# Patient Record
Sex: FEMALE | Race: WHITE | NOT HISPANIC OR LATINO | Employment: OTHER | URBAN - METROPOLITAN AREA
[De-identification: names, ages, dates, MRNs, and addresses within clinical notes are randomized per-mention and may not be internally consistent; named-entity substitution may affect disease eponyms.]

---

## 2017-05-09 ENCOUNTER — OFFICE VISIT (OUTPATIENT)
Dept: FAMILY MEDICINE CLINIC | Facility: CLINIC | Age: 57
End: 2017-05-09

## 2017-05-09 VITALS
HEIGHT: 62 IN | RESPIRATION RATE: 16 BRPM | HEART RATE: 68 BPM | WEIGHT: 171 LBS | BODY MASS INDEX: 31.47 KG/M2 | DIASTOLIC BLOOD PRESSURE: 84 MMHG | SYSTOLIC BLOOD PRESSURE: 126 MMHG | OXYGEN SATURATION: 98 %

## 2017-05-09 DIAGNOSIS — Z12.4 CERVICAL CANCER SCREENING: ICD-10-CM

## 2017-05-09 DIAGNOSIS — I10 ESSENTIAL HYPERTENSION: Primary | ICD-10-CM

## 2017-05-09 PROCEDURE — 99214 OFFICE O/P EST MOD 30 MIN: CPT | Performed by: FAMILY MEDICINE

## 2017-05-09 RX ORDER — LABETALOL 100 MG/1
100 TABLET, FILM COATED ORAL 3 TIMES DAILY
Qty: 270 TABLET | Refills: 3 | Status: SHIPPED | OUTPATIENT
Start: 2017-05-09 | End: 2021-01-28 | Stop reason: SDUPTHER

## 2018-05-14 RX ORDER — LABETALOL 100 MG/1
TABLET, FILM COATED ORAL
Qty: 90 TABLET | Refills: 11 | OUTPATIENT
Start: 2018-05-14

## 2020-07-23 ENCOUNTER — TRANSCRIBE ORDERS (OUTPATIENT)
Dept: ADMINISTRATIVE | Facility: HOSPITAL | Age: 60
End: 2020-07-23

## 2020-07-23 DIAGNOSIS — Z12.31 VISIT FOR SCREENING MAMMOGRAM: Primary | ICD-10-CM

## 2021-01-29 RX ORDER — LABETALOL 100 MG/1
100 TABLET, FILM COATED ORAL 3 TIMES DAILY
Qty: 270 TABLET | Refills: 0 | Status: SHIPPED | OUTPATIENT
Start: 2021-01-29 | End: 2021-04-16 | Stop reason: SDUPTHER

## 2021-04-16 ENCOUNTER — OFFICE VISIT (OUTPATIENT)
Dept: FAMILY MEDICINE CLINIC | Facility: CLINIC | Age: 61
End: 2021-04-16

## 2021-04-16 VITALS
BODY MASS INDEX: 33.23 KG/M2 | HEART RATE: 74 BPM | DIASTOLIC BLOOD PRESSURE: 70 MMHG | HEIGHT: 62 IN | WEIGHT: 180.6 LBS | TEMPERATURE: 98.7 F | SYSTOLIC BLOOD PRESSURE: 128 MMHG | OXYGEN SATURATION: 98 %

## 2021-04-16 DIAGNOSIS — Z85.42 HISTORY OF ENDOMETRIAL CANCER: ICD-10-CM

## 2021-04-16 DIAGNOSIS — I10 ESSENTIAL HYPERTENSION: Primary | ICD-10-CM

## 2021-04-16 DIAGNOSIS — E55.9 VITAMIN D DEFICIENCY: ICD-10-CM

## 2021-04-16 DIAGNOSIS — Z78.0 POSTMENOPAUSAL: ICD-10-CM

## 2021-04-16 PROCEDURE — 99204 OFFICE O/P NEW MOD 45 MIN: CPT | Performed by: FAMILY MEDICINE

## 2021-04-16 RX ORDER — ESTRADIOL 10 UG/1
1 TABLET VAGINAL 3 TIMES WEEKLY
COMMUNITY
Start: 2021-04-08 | End: 2022-06-07 | Stop reason: SDUPTHER

## 2021-04-16 RX ORDER — LABETALOL 100 MG/1
100 TABLET, FILM COATED ORAL 3 TIMES DAILY
Qty: 270 TABLET | Refills: 2 | Status: SHIPPED | OUTPATIENT
Start: 2021-04-16 | End: 2021-08-23

## 2021-04-16 NOTE — PROGRESS NOTES
"Chief Complaint  Establish Care    Subjective          Gini Solorzano presents to [facility] for   Establish care- Pt has last seen me in 2017; has been receiving care at [facility] Dr Morgan Alvarez  Last labs 1 year ago.    Has a history of endometrial cancer 1 year ago and hysterectomy 1 year ago. Follows with GYN at . Would like referred to GYN.    Has a history of Vit D deficiency and needs a check of Vit D.    Hypertension  This is a chronic problem. The current episode started more than 1 year ago. The problem is unchanged. The problem is controlled. Pertinent negatives include no anxiety, chest pain, malaise/fatigue, palpitations, peripheral edema or shortness of breath. There are no associated agents to hypertension. Risk factors for coronary artery disease include obesity, post-menopausal state and family history. Past treatments include beta blockers. Current antihypertension treatment includes beta blockers. The current treatment provides significant improvement. There are no compliance problems.            Objective   Vital Signs:   /70   Pulse 74   Temp 98.7 °F (37.1 °C)   Ht 157.5 cm (62.01\")   Wt 81.9 kg (180 lb 9.6 oz)   SpO2 98%   BMI 33.02 kg/m²       Review of Systems   Constitutional: Negative.  Negative for activity change, fatigue, fever, malaise/fatigue and unexpected weight change.   HENT: Negative.  Negative for congestion, sneezing and sore throat.    Eyes: Negative.  Negative for visual disturbance.   Respiratory: Negative.  Negative for cough, chest tightness, shortness of breath and wheezing.    Cardiovascular: Negative.  Negative for chest pain, palpitations and leg swelling.   Gastrointestinal: Negative.  Negative for abdominal distention, abdominal pain, blood in stool, constipation, diarrhea and nausea.   Endocrine: Negative.  Negative for cold intolerance and heat intolerance.   Genitourinary: Negative.  Negative for dysuria, frequency and " urgency.   Musculoskeletal: Negative.  Negative for arthralgias and myalgias.   Skin: Negative.  Negative for rash.   Allergic/Immunologic: Negative.    Neurological: Negative.  Negative for dizziness, syncope and numbness.   Hematological: Negative.  Negative for adenopathy.   Psychiatric/Behavioral: Negative.  Negative for suicidal ideas. The patient is not nervous/anxious.      Physical Exam  Vitals and nursing note reviewed.   Constitutional:       General: She is not in acute distress.     Appearance: She is well-developed. She is not diaphoretic.   HENT:      Right Ear: External ear normal.      Left Ear: External ear normal.      Nose: Nose normal.   Eyes:      Conjunctiva/sclera: Conjunctivae normal.      Pupils: Pupils are equal, round, and reactive to light.   Neck:      Thyroid: No thyromegaly.   Cardiovascular:      Rate and Rhythm: Normal rate and regular rhythm.      Heart sounds: Normal heart sounds. No murmur heard.     Pulmonary:      Effort: Pulmonary effort is normal. No respiratory distress.      Breath sounds: Normal breath sounds. No wheezing.   Abdominal:      General: Bowel sounds are normal. There is no distension.      Palpations: Abdomen is soft. There is no mass.      Tenderness: There is no abdominal tenderness. There is no guarding or rebound.      Hernia: No hernia is present.   Musculoskeletal:         General: No tenderness. Normal range of motion.      Cervical back: Normal range of motion and neck supple.   Lymphadenopathy:      Cervical: No cervical adenopathy.   Skin:     General: Skin is warm and dry.      Coloration: Skin is not pale.      Findings: No erythema or rash.   Neurological:      Mental Status: She is alert and oriented to person, place, and time.      Deep Tendon Reflexes: Reflexes are normal and symmetric.   Psychiatric:         Behavior: Behavior normal.         Thought Content: Thought content normal.         Judgment: Judgment normal.        Result Review :                  Assessment and Plan    Problem List Items Addressed This Visit     None      Visit Diagnoses     Essential hypertension    -  Primary    Relevant Medications    labetalol (NORMODYNE) 100 MG tablet    Other Relevant Orders    CBC & Differential    Comprehensive Metabolic Panel    Lipid Panel    TSH    Postmenopausal        Relevant Orders    Ambulatory Referral to Obstetrics / Gynecology    History of endometrial cancer        Relevant Orders    Ambulatory Referral to Obstetrics / Gynecology    Vitamin D deficiency        Relevant Orders    Vitamin D 25 Hydroxy        I spent 45 minutes caring for Gini on this date of service. This time includes time spent by me in the following activities:preparing for the visit, reviewing tests, obtaining and/or reviewing a separately obtained history, performing a medically appropriate examination and/or evaluation , counseling and educating the patient/family/caregiver, ordering medications, tests, or procedures, referring and communicating with other health care professionals , documenting information in the medical record, independently interpreting results and communicating that information with the patient/family/caregiver and care coordination  Follow Up   Return in about 3 months (around 7/16/2021).  Patient was given instructions and counseling regarding her condition or for health maintenance advice. Please see specific information pulled into the AVS if appropriate.

## 2021-07-07 ENCOUNTER — TELEPHONE (OUTPATIENT)
Dept: FAMILY MEDICINE CLINIC | Facility: CLINIC | Age: 61
End: 2021-07-07

## 2021-07-07 NOTE — TELEPHONE ENCOUNTER
Caller: Gini Solorzano    Relationship: Self    Best call back number:     [phone]     What is the best time to reach you:     ANY TIME    Who are you requesting to speak with (clinical staff, provider,  specific staff member):     DR LOMBARDI    Do you know the name of the person who called:     N/A    What was the call regarding:     PATIENT READ ABOUT A BETA BLOCKER THAT SHE HAS BEEN TAKING FOR YEARS AND HOW PEOPLE OVER 60 YEARS SHOULD NOT BE TAKING THE MEDICATION    THE MEDICATION NAME IS LABETALOL    PATIENT WOULD LIKE DR LOMBARDI TO READ UP ON MEDICATION AND DANGERS IN TAKING IT WHEN OVER 60 YEARS TO BE ABLE TO DISCUSS FULLY DURING THE APPOINTMENT    Do you require a callback:     PATIENT WOULD LIKE DR LOMBARDI TO GIVE HER A CALL BACK     PATIENT IS CONCERNED THAT SHE SHOULD BE PLACED ON A DIFFERENT MEDICATION    NIH ARTICLE ONLINE REGARDING THIS MEDICATION AND HOW PEOPLE OVER 60 YEARS SHOULD NOT BE TAKING    DR LOMBARDI, DO

## 2021-07-08 NOTE — TELEPHONE ENCOUNTER
Please have pt follow up in office. Beta blockers are cardioprotective. If she would like referred to Cardiology then let me know. I am not the one that started the Labatalol.

## 2021-07-09 ENCOUNTER — LAB (OUTPATIENT)
Dept: FAMILY MEDICINE CLINIC | Facility: CLINIC | Age: 61
End: 2021-07-09

## 2021-07-09 DIAGNOSIS — E55.9 VITAMIN D DEFICIENCY: ICD-10-CM

## 2021-07-09 DIAGNOSIS — I10 ESSENTIAL HYPERTENSION: ICD-10-CM

## 2021-07-09 LAB
25(OH)D3 SERPL-MCNC: 58 NG/ML
ALBUMIN SERPL-MCNC: 4.7 G/DL (ref 3.5–5.2)
ALBUMIN/GLOB SERPL: 1.7 G/DL
ALP SERPL-CCNC: 49 U/L (ref 39–117)
ALT SERPL W P-5'-P-CCNC: 28 U/L (ref 1–33)
ANION GAP SERPL CALCULATED.3IONS-SCNC: 8.1 MMOL/L (ref 5–15)
AST SERPL-CCNC: 27 U/L (ref 1–32)
BASOPHILS # BLD AUTO: 0.08 10*3/MM3 (ref 0–0.2)
BASOPHILS NFR BLD AUTO: 1.1 % (ref 0–1.5)
BILIRUB SERPL-MCNC: 0.6 MG/DL (ref 0–1.2)
BUN SERPL-MCNC: 12 MG/DL (ref 8–23)
BUN/CREAT SERPL: 14.3 (ref 7–25)
CALCIUM SPEC-SCNC: 9.5 MG/DL (ref 8.6–10.5)
CHLORIDE SERPL-SCNC: 103 MMOL/L (ref 98–107)
CHOLEST SERPL-MCNC: 183 MG/DL (ref 0–200)
CO2 SERPL-SCNC: 28.9 MMOL/L (ref 22–29)
CREAT SERPL-MCNC: 0.84 MG/DL (ref 0.57–1)
DEPRECATED RDW RBC AUTO: 41.8 FL (ref 37–54)
EOSINOPHIL # BLD AUTO: 0.31 10*3/MM3 (ref 0–0.4)
EOSINOPHIL NFR BLD AUTO: 4.4 % (ref 0.3–6.2)
ERYTHROCYTE [DISTWIDTH] IN BLOOD BY AUTOMATED COUNT: 12.8 % (ref 12.3–15.4)
GFR SERPL CREATININE-BSD FRML MDRD: 69 ML/MIN/1.73
GLOBULIN UR ELPH-MCNC: 2.8 GM/DL
GLUCOSE SERPL-MCNC: 106 MG/DL (ref 65–99)
HCT VFR BLD AUTO: 42.1 % (ref 34–46.6)
HDLC SERPL-MCNC: 47 MG/DL (ref 40–60)
HGB BLD-MCNC: 14.3 G/DL (ref 12–15.9)
IMM GRANULOCYTES # BLD AUTO: 0.02 10*3/MM3 (ref 0–0.05)
IMM GRANULOCYTES NFR BLD AUTO: 0.3 % (ref 0–0.5)
LDLC SERPL CALC-MCNC: 122 MG/DL (ref 0–100)
LDLC/HDLC SERPL: 2.56 {RATIO}
LYMPHOCYTES # BLD AUTO: 2.57 10*3/MM3 (ref 0.7–3.1)
LYMPHOCYTES NFR BLD AUTO: 36.2 % (ref 19.6–45.3)
MCH RBC QN AUTO: 30 PG (ref 26.6–33)
MCHC RBC AUTO-ENTMCNC: 34 G/DL (ref 31.5–35.7)
MCV RBC AUTO: 88.4 FL (ref 79–97)
MONOCYTES # BLD AUTO: 0.73 10*3/MM3 (ref 0.1–0.9)
MONOCYTES NFR BLD AUTO: 10.3 % (ref 5–12)
NEUTROPHILS NFR BLD AUTO: 3.39 10*3/MM3 (ref 1.7–7)
NEUTROPHILS NFR BLD AUTO: 47.7 % (ref 42.7–76)
NRBC BLD AUTO-RTO: 0 /100 WBC (ref 0–0.2)
PLATELET # BLD AUTO: 388 10*3/MM3 (ref 140–450)
PMV BLD AUTO: 9.8 FL (ref 6–12)
POTASSIUM SERPL-SCNC: 4.6 MMOL/L (ref 3.5–5.2)
PROT SERPL-MCNC: 7.5 G/DL (ref 6–8.5)
RBC # BLD AUTO: 4.76 10*6/MM3 (ref 3.77–5.28)
SODIUM SERPL-SCNC: 140 MMOL/L (ref 136–145)
TRIGL SERPL-MCNC: 78 MG/DL (ref 0–150)
TSH SERPL DL<=0.05 MIU/L-ACNC: 2.87 UIU/ML (ref 0.27–4.2)
VLDLC SERPL-MCNC: 14 MG/DL (ref 5–40)
WBC # BLD AUTO: 7.1 10*3/MM3 (ref 3.4–10.8)

## 2021-07-09 PROCEDURE — 82306 VITAMIN D 25 HYDROXY: CPT | Performed by: FAMILY MEDICINE

## 2021-07-09 PROCEDURE — 85025 COMPLETE CBC W/AUTO DIFF WBC: CPT | Performed by: FAMILY MEDICINE

## 2021-07-09 PROCEDURE — 80053 COMPREHEN METABOLIC PANEL: CPT | Performed by: FAMILY MEDICINE

## 2021-07-09 PROCEDURE — 80061 LIPID PANEL: CPT | Performed by: FAMILY MEDICINE

## 2021-07-09 PROCEDURE — 84443 ASSAY THYROID STIM HORMONE: CPT | Performed by: FAMILY MEDICINE

## 2021-07-20 ENCOUNTER — OFFICE VISIT (OUTPATIENT)
Dept: FAMILY MEDICINE CLINIC | Facility: CLINIC | Age: 61
End: 2021-07-20

## 2021-07-20 VITALS
HEART RATE: 70 BPM | DIASTOLIC BLOOD PRESSURE: 94 MMHG | HEIGHT: 62 IN | SYSTOLIC BLOOD PRESSURE: 146 MMHG | WEIGHT: 176 LBS | TEMPERATURE: 98.2 F | BODY MASS INDEX: 32.39 KG/M2 | OXYGEN SATURATION: 98 %

## 2021-07-20 DIAGNOSIS — I10 ESSENTIAL HYPERTENSION: Primary | Chronic | ICD-10-CM

## 2021-07-20 DIAGNOSIS — F41.9 ANXIETY: Chronic | ICD-10-CM

## 2021-07-20 PROCEDURE — 99214 OFFICE O/P EST MOD 30 MIN: CPT | Performed by: FAMILY MEDICINE

## 2021-07-20 RX ORDER — HYDROCHLOROTHIAZIDE 12.5 MG/1
12.5 TABLET ORAL DAILY
Qty: 30 TABLET | Refills: 1 | Status: SHIPPED | OUTPATIENT
Start: 2021-07-20 | End: 2021-08-23 | Stop reason: SDUPTHER

## 2021-07-20 RX ORDER — CITALOPRAM 20 MG/1
TABLET ORAL
Qty: 30 TABLET | Refills: 0 | Status: SHIPPED | OUTPATIENT
Start: 2021-07-20 | End: 2021-08-23 | Stop reason: SDUPTHER

## 2021-07-20 NOTE — PROGRESS NOTES
"Chief Complaint  Hypertension (follow up and discuss BP meds)    Subjective          Gini Solorzano presents to [facility] for   Pt concerned about Labetalol and has read an article about beta blockers. Pt has lower HR on med and increased fatigue.  Has increased anxiety and thinks this stress needs lowered with a possible anti-anxiety med.    Hypertension  This is a chronic problem. The current episode started more than 1 year ago. The problem is unchanged. The problem is controlled. Pertinent negatives include no anxiety, chest pain, malaise/fatigue, palpitations, peripheral edema or shortness of breath. There are no associated agents to hypertension. Risk factors for coronary artery disease include obesity, post-menopausal state and family history. Past treatments include beta blockers. Current antihypertension treatment includes beta blockers. The current treatment provides significant improvement. There are no compliance problems.    Anxiety  Presents for initial visit. Onset was 1 to 6 months ago. The problem has been unchanged. Symptoms include excessive worry, insomnia, irritability, malaise and nervous/anxious behavior. Patient reports no chest pain, palpitations, panic, restlessness, shortness of breath or suicidal ideas. Symptoms occur occasionally. The severity of symptoms is mild. The symptoms are aggravated by work stress. The patient sleeps 7 hours per night. The quality of sleep is fair. Nighttime awakenings: occasional.           Objective   Vital Signs:   /94 (BP Location: Right arm, Patient Position: Sitting, Cuff Size: Adult)   Pulse 70   Temp 98.2 °F (36.8 °C) (Temporal)   Ht 157.5 cm (62\")   Wt 79.8 kg (176 lb)   SpO2 98%   BMI 32.19 kg/m²       Review of Systems   Constitutional: Positive for fatigue and irritability. Negative for fever, malaise/fatigue and unexpected weight change.   Respiratory: Negative for chest tightness, shortness of breath and " wheezing.    Cardiovascular: Negative for chest pain, palpitations and leg swelling.   Psychiatric/Behavioral: Negative for self-injury, sleep disturbance and suicidal ideas. The patient is nervous/anxious and has insomnia.      Physical Exam  Vitals and nursing note reviewed.   Constitutional:       General: She is not in acute distress.     Appearance: She is well-developed. She is not diaphoretic.   HENT:      Right Ear: External ear normal.      Left Ear: External ear normal.      Nose: Nose normal.   Eyes:      Conjunctiva/sclera: Conjunctivae normal.      Pupils: Pupils are equal, round, and reactive to light.   Neck:      Thyroid: No thyromegaly.   Cardiovascular:      Rate and Rhythm: Normal rate and regular rhythm.      Heart sounds: Normal heart sounds. No murmur heard.     Pulmonary:      Effort: Pulmonary effort is normal. No respiratory distress.      Breath sounds: Normal breath sounds. No wheezing.   Abdominal:      General: Bowel sounds are normal. There is no distension.      Palpations: Abdomen is soft. There is no mass.      Tenderness: There is no abdominal tenderness. There is no guarding or rebound.      Hernia: No hernia is present.   Musculoskeletal:         General: No tenderness. Normal range of motion.      Cervical back: Normal range of motion and neck supple.   Lymphadenopathy:      Cervical: No cervical adenopathy.   Skin:     General: Skin is warm and dry.      Coloration: Skin is not pale.      Findings: No erythema or rash.   Neurological:      Mental Status: She is alert and oriented to person, place, and time.      Deep Tendon Reflexes: Reflexes are normal and symmetric.   Psychiatric:         Behavior: Behavior normal.         Thought Content: Thought content normal.         Judgment: Judgment normal.        Result Review :     CBC & Differential (07/09/2021 09:33)  Comprehensive Metabolic Panel (07/09/2021 09:33)  Lipid Panel (07/09/2021 09:33)  TSH (07/09/2021 09:33)  Vitamin D  25 Hydroxy (07/09/2021 09:33)                Assessment and Plan    Problem List Items Addressed This Visit     None      Visit Diagnoses     Essential hypertension  (Chronic)   -  Primary    Relevant Medications    hydroCHLOROthiazide (HYDRODIURIL) 12.5 MG tablet    Anxiety  (Chronic)       Relevant Medications    citalopram (CeleXA) 20 MG tablet      Will slowly tape off Labetalol to 50 bid until seen in 1 month.    Follow Up   Return in about 4 weeks (around 8/17/2021).  Patient was given instructions and counseling regarding her condition or for health maintenance advice. Please see specific information pulled into the AVS if appropriate.

## 2021-08-23 ENCOUNTER — OFFICE VISIT (OUTPATIENT)
Dept: FAMILY MEDICINE CLINIC | Facility: CLINIC | Age: 61
End: 2021-08-23

## 2021-08-23 VITALS
OXYGEN SATURATION: 99 % | HEART RATE: 65 BPM | WEIGHT: 173.4 LBS | RESPIRATION RATE: 18 BRPM | BODY MASS INDEX: 31.91 KG/M2 | SYSTOLIC BLOOD PRESSURE: 130 MMHG | HEIGHT: 62 IN | DIASTOLIC BLOOD PRESSURE: 86 MMHG | TEMPERATURE: 97.5 F

## 2021-08-23 DIAGNOSIS — I10 ESSENTIAL HYPERTENSION: Chronic | ICD-10-CM

## 2021-08-23 DIAGNOSIS — F41.9 ANXIETY: Chronic | ICD-10-CM

## 2021-08-23 PROCEDURE — 99214 OFFICE O/P EST MOD 30 MIN: CPT | Performed by: FAMILY MEDICINE

## 2021-08-23 RX ORDER — HYDROCHLOROTHIAZIDE 12.5 MG/1
12.5 TABLET ORAL DAILY
Qty: 90 TABLET | Refills: 1 | Status: SHIPPED | OUTPATIENT
Start: 2021-08-23 | End: 2021-09-14

## 2021-08-23 RX ORDER — CITALOPRAM 20 MG/1
20 TABLET ORAL DAILY
Qty: 90 TABLET | Refills: 0 | Status: SHIPPED | OUTPATIENT
Start: 2021-08-23 | End: 2021-11-29

## 2021-08-23 RX ORDER — LOSARTAN POTASSIUM 50 MG/1
50 TABLET ORAL DAILY
Qty: 90 TABLET | Refills: 0 | Status: SHIPPED | OUTPATIENT
Start: 2021-08-23 | End: 2021-11-08

## 2021-08-23 NOTE — PROGRESS NOTES
"Chief Complaint  Hypertension (follow up)    Subjective          Gini Solorzano presents to [facility] for   Follow up BP. Trying to wean off of Labetalol. Has added HCTZ. BP is staying higher in the evenings.    Hypertension  This is a chronic problem. The current episode started more than 1 year ago. The problem is unchanged. The problem is controlled. Pertinent negatives include no anxiety, chest pain, malaise/fatigue, palpitations, peripheral edema or shortness of breath. There are no associated agents to hypertension. Risk factors for coronary artery disease include obesity, post-menopausal state and family history. Past treatments include beta blockers and diuretics. Current antihypertension treatment includes beta blockers and diuretics. The current treatment provides significant improvement. There are no compliance problems.    Anxiety  Presents for follow-up (Stable on Celexa) visit. Onset was 1 to 6 months ago. The problem has been unchanged. Symptoms include insomnia. Patient reports no chest pain, dizziness, excessive worry, irritability, malaise, nausea, nervous/anxious behavior, palpitations, panic, restlessness, shortness of breath or suicidal ideas. Symptoms occur occasionally. The severity of symptoms is mild. The symptoms are aggravated by work stress. The patient sleeps 7 hours per night. The quality of sleep is good. Nighttime awakenings: occasional.           Objective   Vital Signs:   /86 (BP Location: Right arm, Patient Position: Sitting, Cuff Size: Adult)   Pulse 65   Temp 97.5 °F (36.4 °C) (Infrared)   Resp 18   Ht 157.5 cm (62\")   Wt 78.7 kg (173 lb 6.4 oz)   SpO2 99%   BMI 31.72 kg/m²       Review of Systems   Constitutional: Negative.  Negative for activity change, fatigue, fever, irritability, malaise/fatigue and unexpected weight change.   HENT: Negative.  Negative for congestion, sneezing and sore throat.    Eyes: Negative.  Negative for " visual disturbance.   Respiratory: Negative.  Negative for cough, chest tightness, shortness of breath and wheezing.    Cardiovascular: Negative.  Negative for chest pain, palpitations and leg swelling.   Gastrointestinal: Negative.  Negative for abdominal distention, abdominal pain, blood in stool, constipation, diarrhea and nausea.   Endocrine: Negative.  Negative for cold intolerance and heat intolerance.   Genitourinary: Negative.  Negative for dysuria, frequency and urgency.   Musculoskeletal: Negative.  Negative for arthralgias and myalgias.   Skin: Negative.  Negative for rash.   Allergic/Immunologic: Negative.    Neurological: Negative.  Negative for dizziness, syncope and numbness.   Hematological: Negative.  Negative for adenopathy.   Psychiatric/Behavioral: Negative for suicidal ideas. The patient has insomnia. The patient is not nervous/anxious.      Physical Exam  Vitals and nursing note reviewed.   Constitutional:       General: She is not in acute distress.     Appearance: She is well-developed. She is not diaphoretic.   HENT:      Right Ear: External ear normal.      Left Ear: External ear normal.      Nose: Nose normal.   Eyes:      Conjunctiva/sclera: Conjunctivae normal.      Pupils: Pupils are equal, round, and reactive to light.   Neck:      Thyroid: No thyromegaly.   Cardiovascular:      Rate and Rhythm: Normal rate and regular rhythm.      Heart sounds: Normal heart sounds. No murmur heard.     Pulmonary:      Effort: Pulmonary effort is normal. No respiratory distress.      Breath sounds: Normal breath sounds. No wheezing.   Abdominal:      General: Bowel sounds are normal. There is no distension.      Palpations: Abdomen is soft. There is no mass.      Tenderness: There is no abdominal tenderness. There is no guarding or rebound.      Hernia: No hernia is present.   Musculoskeletal:         General: No tenderness. Normal range of motion.      Cervical back: Normal range of motion and neck  supple.   Lymphadenopathy:      Cervical: No cervical adenopathy.   Skin:     General: Skin is warm and dry.      Coloration: Skin is not pale.      Findings: No erythema or rash.   Neurological:      Mental Status: She is alert and oriented to person, place, and time.      Deep Tendon Reflexes: Reflexes are normal and symmetric.   Psychiatric:         Behavior: Behavior normal.         Thought Content: Thought content normal.         Judgment: Judgment normal.        Result Review :                 Assessment and Plan    Problem List Items Addressed This Visit     None      Visit Diagnoses     Anxiety  (Chronic)       Relevant Medications    citalopram (CeleXA) 20 MG tablet    Essential hypertension  (Chronic)       Relevant Medications    hydroCHLOROthiazide (HYDRODIURIL) 12.5 MG tablet    losartan (Cozaar) 50 MG tablet      OK to stop Labetalol.  Add Losartan 50 mg qd      Follow Up   Return in about 3 months (around 11/23/2021).  Patient was given instructions and counseling regarding her condition or for health maintenance advice. Please see specific information pulled into the AVS if appropriate.

## 2021-09-14 DIAGNOSIS — I10 ESSENTIAL HYPERTENSION: Chronic | ICD-10-CM

## 2021-09-14 RX ORDER — HYDROCHLOROTHIAZIDE 12.5 MG/1
TABLET ORAL
Qty: 30 TABLET | Refills: 2 | Status: SHIPPED | OUTPATIENT
Start: 2021-09-14 | End: 2021-11-08

## 2021-11-06 ENCOUNTER — TELEPHONE (OUTPATIENT)
Dept: FAMILY MEDICINE CLINIC | Facility: CLINIC | Age: 61
End: 2021-11-06

## 2021-11-06 NOTE — TELEPHONE ENCOUNTER
CALLED PT TO RESCHEDULE PATIENT DUE TO PROVIDER BEING OUT SHE WILL NOT HAVE ENOUGH MEDICATION TO GET THROUGH UNTIL 11/29/21 REQUESTING REFILL'S ON   LOSARTAN AND HYDROCHLOROTHIAZIDE

## 2021-11-08 DIAGNOSIS — I10 ESSENTIAL HYPERTENSION: Chronic | ICD-10-CM

## 2021-11-08 RX ORDER — LOSARTAN POTASSIUM 50 MG/1
50 TABLET ORAL DAILY
Qty: 30 TABLET | Refills: 0 | Status: SHIPPED | OUTPATIENT
Start: 2021-11-08 | End: 2021-11-29 | Stop reason: SDUPTHER

## 2021-11-08 RX ORDER — HYDROCHLOROTHIAZIDE 12.5 MG/1
12.5 TABLET ORAL DAILY
Qty: 30 TABLET | Refills: 0 | Status: SHIPPED | OUTPATIENT
Start: 2021-11-08 | End: 2021-11-29 | Stop reason: SDUPTHER

## 2021-11-29 ENCOUNTER — OFFICE VISIT (OUTPATIENT)
Dept: FAMILY MEDICINE CLINIC | Facility: CLINIC | Age: 61
End: 2021-11-29

## 2021-11-29 VITALS
HEIGHT: 62 IN | WEIGHT: 171.8 LBS | RESPIRATION RATE: 12 BRPM | DIASTOLIC BLOOD PRESSURE: 86 MMHG | TEMPERATURE: 98.4 F | BODY MASS INDEX: 31.62 KG/M2 | OXYGEN SATURATION: 97 % | SYSTOLIC BLOOD PRESSURE: 132 MMHG | HEART RATE: 79 BPM

## 2021-11-29 DIAGNOSIS — F41.9 ANXIETY: Chronic | ICD-10-CM

## 2021-11-29 DIAGNOSIS — I10 ESSENTIAL HYPERTENSION: Primary | ICD-10-CM

## 2021-11-29 PROBLEM — C54.1 ENDOMETRIAL ADENOCARCINOMA: Status: ACTIVE | Noted: 2020-01-13

## 2021-11-29 PROCEDURE — 90471 IMMUNIZATION ADMIN: CPT | Performed by: FAMILY MEDICINE

## 2021-11-29 PROCEDURE — 99213 OFFICE O/P EST LOW 20 MIN: CPT | Performed by: FAMILY MEDICINE

## 2021-11-29 PROCEDURE — 90686 IIV4 VACC NO PRSV 0.5 ML IM: CPT | Performed by: FAMILY MEDICINE

## 2021-11-29 RX ORDER — KETOCONAZOLE 20 MG/ML
SHAMPOO TOPICAL
COMMUNITY
Start: 2021-09-24

## 2021-11-29 RX ORDER — LOSARTAN POTASSIUM 50 MG/1
50 TABLET ORAL DAILY
Qty: 90 TABLET | Refills: 1 | Status: SHIPPED | OUTPATIENT
Start: 2021-11-29 | End: 2022-06-07 | Stop reason: SDUPTHER

## 2021-11-29 RX ORDER — CITALOPRAM 10 MG/1
10 TABLET ORAL DAILY
Qty: 90 TABLET | Refills: 1 | Status: SHIPPED | OUTPATIENT
Start: 2021-11-29 | End: 2022-06-07 | Stop reason: SDUPTHER

## 2021-11-29 RX ORDER — HYDROCHLOROTHIAZIDE 12.5 MG/1
12.5 TABLET ORAL DAILY
Qty: 90 TABLET | Refills: 1 | Status: SHIPPED | OUTPATIENT
Start: 2021-11-29 | End: 2022-06-07 | Stop reason: SDUPTHER

## 2021-11-29 NOTE — PROGRESS NOTES
"Chief Complaint  Hypertension/anxiety f/u    Subjective          Gini Solorzano presents to [facility] for   BP has been running higher in the evenings.    Taking Celexa 10 mg and stable.  Hypertension  This is a chronic problem. The current episode started more than 1 year ago. The problem is unchanged. The problem is controlled. Pertinent negatives include no anxiety, chest pain, malaise/fatigue, palpitations, peripheral edema or shortness of breath. There are no associated agents to hypertension. Risk factors for coronary artery disease include obesity, post-menopausal state and family history. Past treatments include beta blockers and diuretics. Current antihypertension treatment includes beta blockers and diuretics. The current treatment provides significant improvement. There are no compliance problems.    Anxiety  Presents for follow-up (Stable on Celexa) visit. Onset was 1 to 6 months ago. The problem has been unchanged. Patient reports no chest pain, dizziness, excessive worry, insomnia, irritability, malaise, nausea, nervous/anxious behavior, palpitations, panic, restlessness, shortness of breath or suicidal ideas. Symptoms occur occasionally. The severity of symptoms is mild. The symptoms are aggravated by work stress. The patient sleeps 7 hours per night. The quality of sleep is good. Nighttime awakenings: occasional.           Objective   Vital Signs:   /86   Pulse 79   Temp 98.4 °F (36.9 °C)   Resp 12   Ht 157.5 cm (62\")   Wt 77.9 kg (171 lb 12.8 oz)   SpO2 97%   BMI 31.42 kg/m²      Review of Systems   Constitutional: Negative.  Negative for activity change, fatigue, fever, irritability, malaise/fatigue and unexpected weight change.   HENT: Negative.  Negative for congestion, sneezing and sore throat.    Eyes: Negative.  Negative for visual disturbance.   Respiratory: Negative.  Negative for cough, chest tightness, shortness of breath and wheezing.  "   Cardiovascular: Negative.  Negative for chest pain, palpitations and leg swelling.   Gastrointestinal: Negative.  Negative for abdominal distention, abdominal pain, blood in stool, constipation, diarrhea and nausea.   Endocrine: Negative.  Negative for cold intolerance and heat intolerance.   Genitourinary: Negative.  Negative for dysuria, frequency and urgency.   Musculoskeletal: Negative.  Negative for arthralgias and myalgias.   Skin: Negative.  Negative for rash.   Allergic/Immunologic: Negative.    Neurological: Negative.  Negative for dizziness, syncope and numbness.   Hematological: Negative.  Negative for adenopathy.   Psychiatric/Behavioral: Negative.  Negative for suicidal ideas. The patient is not nervous/anxious and does not have insomnia.       Physical Exam  Vitals and nursing note reviewed.   Constitutional:       General: She is not in acute distress.     Appearance: She is well-developed. She is not diaphoretic.   HENT:      Right Ear: External ear normal.      Left Ear: External ear normal.      Nose: Nose normal.   Eyes:      Conjunctiva/sclera: Conjunctivae normal.   Neck:      Thyroid: No thyromegaly.   Cardiovascular:      Heart sounds: No murmur heard.      Pulmonary:      Effort: Pulmonary effort is normal. No respiratory distress.      Breath sounds: No wheezing.   Abdominal:      General: Bowel sounds are normal. There is no distension.      Palpations: Abdomen is soft. There is no mass.      Tenderness: There is no abdominal tenderness. There is no guarding or rebound.      Hernia: No hernia is present.   Musculoskeletal:         General: No tenderness. Normal range of motion.      Cervical back: Normal range of motion and neck supple.   Lymphadenopathy:      Cervical: No cervical adenopathy.   Skin:     General: Skin is warm and dry.      Coloration: Skin is not pale.      Findings: No erythema or rash.   Neurological:      Mental Status: She is alert and oriented to person, place, and  time.      Deep Tendon Reflexes: Reflexes are normal and symmetric.   Psychiatric:         Behavior: Behavior normal.         Thought Content: Thought content normal.         Judgment: Judgment normal.        Result Review :                 Assessment and Plan    Problem List Items Addressed This Visit     None      Visit Diagnoses     Essential hypertension    -  Primary    Relevant Medications    hydroCHLOROthiazide (HYDRODIURIL) 12.5 MG tablet    losartan (Cozaar) 50 MG tablet    Anxiety  (Chronic)       Relevant Medications    citalopram (CeleXA) 10 MG tablet          Follow Up   Return in about 3 months (around 2/28/2022).  Patient was given instructions and counseling regarding her condition or for health maintenance advice. Please see specific information pulled into the AVS if appropriate.

## 2022-06-07 DIAGNOSIS — I10 ESSENTIAL HYPERTENSION: ICD-10-CM

## 2022-06-07 DIAGNOSIS — F41.9 ANXIETY: Chronic | ICD-10-CM

## 2022-06-07 RX ORDER — CITALOPRAM 10 MG/1
10 TABLET ORAL DAILY
Qty: 90 TABLET | Refills: 1 | Status: SHIPPED | OUTPATIENT
Start: 2022-06-07 | End: 2022-12-01

## 2022-06-07 RX ORDER — CITALOPRAM 10 MG/1
10 TABLET ORAL DAILY
Qty: 90 TABLET | Refills: 1 | Status: CANCELLED | OUTPATIENT
Start: 2022-06-07

## 2022-06-07 RX ORDER — HYDROCHLOROTHIAZIDE 12.5 MG/1
12.5 TABLET ORAL DAILY
Qty: 90 TABLET | Refills: 1 | Status: SHIPPED | OUTPATIENT
Start: 2022-06-07 | End: 2022-12-01

## 2022-06-07 RX ORDER — ESTRADIOL 10 UG/1
1 TABLET VAGINAL 3 TIMES WEEKLY
Qty: 8 TABLET | Refills: 2 | Status: SHIPPED | OUTPATIENT
Start: 2022-06-08 | End: 2022-06-16

## 2022-06-07 RX ORDER — LOSARTAN POTASSIUM 50 MG/1
50 TABLET ORAL DAILY
Qty: 90 TABLET | Refills: 1 | Status: SHIPPED | OUTPATIENT
Start: 2022-06-07 | End: 2022-12-01

## 2022-06-07 NOTE — TELEPHONE ENCOUNTER
Caller: Gini Solorzano    Relationship: Self    Best call back number: [phone]    Requested Prescriptions:   Requested Prescriptions     Pending Prescriptions Disp Refills   • citalopram (CeleXA) 10 MG tablet 90 tablet 1     Sig: Take 1 tablet by mouth Daily.   • hydroCHLOROthiazide (HYDRODIURIL) 12.5 MG tablet 90 tablet 1     Sig: Take 1 tablet by mouth Daily.   • losartan (Cozaar) 50 MG tablet 90 tablet 1     Sig: Take 1 tablet by mouth Daily.   • Yuvafem 10 MCG tablet vaginal tablet 8 tablet      Sig: Insert 1 tablet into the vagina 3 (Three) Times a Week.        Pharmacy where request should be sent: [address] [phone] [facility] [phone]      Additional details provided by patient: PATIENT IS OFFBOARDED FROM TARSHA LOMBARDI AND HAS HER NEW PATIENT APPOINTMENT ON 6/16 WITH [facility]. SHE WILL RUN OUT OF HER MEDICATION THIS WEEK. PLEASE ASK [facility] TO SEND IN ENOUGH UNTIL THE APPOINTMENT.    PLEASE CALL WHEN THIS IS CALLED IN    Does the patient have less than a 3 day supply:  [x] Yes  [] No    Lakshmi Lam, PCT   06/07/22 10:38 EDT

## 2022-06-16 ENCOUNTER — OFFICE VISIT (OUTPATIENT)
Dept: FAMILY MEDICINE CLINIC | Facility: CLINIC | Age: 62
End: 2022-06-16

## 2022-06-16 VITALS
BODY MASS INDEX: 31.65 KG/M2 | DIASTOLIC BLOOD PRESSURE: 80 MMHG | WEIGHT: 172 LBS | RESPIRATION RATE: 18 BRPM | OXYGEN SATURATION: 97 % | SYSTOLIC BLOOD PRESSURE: 128 MMHG | HEIGHT: 62 IN | HEART RATE: 66 BPM | TEMPERATURE: 98.6 F

## 2022-06-16 DIAGNOSIS — F41.9 ANXIETY: ICD-10-CM

## 2022-06-16 DIAGNOSIS — I10 ESSENTIAL HYPERTENSION: Primary | ICD-10-CM

## 2022-06-16 PROCEDURE — 99214 OFFICE O/P EST MOD 30 MIN: CPT | Performed by: STUDENT IN AN ORGANIZED HEALTH CARE EDUCATION/TRAINING PROGRAM

## 2022-06-16 NOTE — PROGRESS NOTES
New Patient Office Visit      Patient Name: Gini Solorzano  : 1960   MRN: [id number]     Chief Complaint:  anxiety    History of Present Illness:     anxiety  Taking citalopram. She is not suicidal or homicidal.     htn  Taking cozar and hctz. She checks her bp frequently at home. We discussed normal parameters. She says it is typically 130 systolic.       She uses a vaginal estrogen tablet. She had a hysterectomy due to cancer. I have advised her to no longer take this. It was last called in not by me , but under my name per protocol via my MA. I have called pharmacy to cancel refills on this and advised patient to not take given risk of cancer. Reviewed note Dr. barkley wanting her to come for surveillance at dr ramirez office who she has seen in the past. I have advised her to call their office asap for surveillance apt. She says she has their number.         Subjective        Past Medical History:   Diagnosis Date   • Hypertension        Past Surgical History:   Procedure Laterality Date   • MOUTH SURGERY         Family History   Problem Relation Age of Onset   • Cancer Mother         lung   • Hypertension Father    • Hypertension Brother        Social History     Socioeconomic History   • Marital status:    Tobacco Use   • Smoking status: Never Smoker   • Smokeless tobacco: Never Used   Vaping Use   • Vaping Use: Never used   Substance and Sexual Activity   • Alcohol use: No   • Drug use: No   • Sexual activity: Defer          Current Outpatient Medications:   •  citalopram (CeleXA) 10 MG tablet, Take 1 tablet by mouth Daily., Disp: 90 tablet, Rfl: 1  •  hydroCHLOROthiazide (HYDRODIURIL) 12.5 MG tablet, Take 1 tablet by mouth Daily., Disp: 90 tablet, Rfl: 1  •  ketoconazole (NIZORAL) 2 % shampoo, , Disp: , Rfl:   •  losartan (Cozaar) 50 MG tablet, Take 1 tablet by mouth Daily., Disp: 90 tablet, Rfl: 1    Allergies   Allergen Reactions   • Other      Unknown medication given when patient  "had wisdom teeth out       Objective     Physical Exam:  Vitals:    06/16/22 0838   BP: 128/80   Pulse: 66   Resp: 18   Temp: 98.6 °F (37 °C)   SpO2: 97%   Weight: 78 kg (172 lb)   Height: 157.5 cm (62\")      Body mass index is 31.46 kg/m².     Physical Exam  Constitutional:       General: She is not in acute distress.     Appearance: Normal appearance.   HENT:      Head: Normocephalic and atraumatic.   Eyes:      Extraocular Movements: Extraocular movements intact.   Cardiovascular:      Rate and Rhythm: Normal rate and regular rhythm.      Heart sounds: No murmur heard.  Pulmonary:      Effort: Pulmonary effort is normal. No respiratory distress.      Breath sounds: Normal breath sounds.   Abdominal:      General: Abdomen is flat.   Musculoskeletal:         General: No swelling.      Cervical back: Normal range of motion.   Skin:     Findings: No rash.   Neurological:      General: No focal deficit present.      Mental Status: She is alert.   Psychiatric:         Mood and Affect: Mood normal.              Assessment / Plan      Assessment/Plan:   Diagnoses and all orders for this visit:    1. Essential hypertension (Primary)    2. Anxiety     Continue current medications for anxiety and htn.     She says she was told she has dry eye by her optometrist. Advised referral to ophthalmology, she declines.     For vaginal dryness see above hpi.    Return in about 5 months (around 11/16/2022) for Annual.       Sandra Miranda D.O.  [facility] [place]     "

## 2022-11-10 ENCOUNTER — FLU SHOT (OUTPATIENT)
Dept: FAMILY MEDICINE CLINIC | Facility: CLINIC | Age: 62
End: 2022-11-10

## 2022-11-10 DIAGNOSIS — Z23 IMMUNIZATION DUE: Primary | ICD-10-CM

## 2022-11-10 PROCEDURE — 90471 IMMUNIZATION ADMIN: CPT | Performed by: STUDENT IN AN ORGANIZED HEALTH CARE EDUCATION/TRAINING PROGRAM

## 2022-11-10 PROCEDURE — 90686 IIV4 VACC NO PRSV 0.5 ML IM: CPT | Performed by: STUDENT IN AN ORGANIZED HEALTH CARE EDUCATION/TRAINING PROGRAM

## 2022-11-17 ENCOUNTER — LAB (OUTPATIENT)
Dept: LAB | Facility: HOSPITAL | Age: 62
End: 2022-11-17

## 2022-11-17 ENCOUNTER — OFFICE VISIT (OUTPATIENT)
Dept: FAMILY MEDICINE CLINIC | Facility: CLINIC | Age: 62
End: 2022-11-17

## 2022-11-17 VITALS
TEMPERATURE: 97.8 F | WEIGHT: 174.6 LBS | SYSTOLIC BLOOD PRESSURE: 122 MMHG | OXYGEN SATURATION: 99 % | RESPIRATION RATE: 17 BRPM | HEART RATE: 75 BPM | DIASTOLIC BLOOD PRESSURE: 78 MMHG | BODY MASS INDEX: 32.13 KG/M2 | HEIGHT: 62 IN

## 2022-11-17 DIAGNOSIS — Z86.39 HISTORY OF VITAMIN D DEFICIENCY: ICD-10-CM

## 2022-11-17 DIAGNOSIS — E78.00 ELEVATED LDL CHOLESTEROL LEVEL: ICD-10-CM

## 2022-11-17 DIAGNOSIS — Z12.31 ENCOUNTER FOR SCREENING MAMMOGRAM FOR MALIGNANT NEOPLASM OF BREAST: ICD-10-CM

## 2022-11-17 DIAGNOSIS — Z00.00 ANNUAL PHYSICAL EXAM: Primary | ICD-10-CM

## 2022-11-17 DIAGNOSIS — I10 ESSENTIAL HYPERTENSION: ICD-10-CM

## 2022-11-17 LAB
25(OH)D3 SERPL-MCNC: 55.1 NG/ML (ref 30–100)
ALBUMIN SERPL-MCNC: 4.9 G/DL (ref 3.5–5.2)
ALBUMIN/GLOB SERPL: 1.5 G/DL
ALP SERPL-CCNC: 56 U/L (ref 39–117)
ALT SERPL W P-5'-P-CCNC: 18 U/L (ref 1–33)
ANION GAP SERPL CALCULATED.3IONS-SCNC: 10.7 MMOL/L (ref 5–15)
AST SERPL-CCNC: 31 U/L (ref 1–32)
BILIRUB SERPL-MCNC: 0.3 MG/DL (ref 0–1.2)
BUN SERPL-MCNC: 11 MG/DL (ref 8–23)
BUN/CREAT SERPL: 13.6 (ref 7–25)
CALCIUM SPEC-SCNC: 9.9 MG/DL (ref 8.6–10.5)
CHLORIDE SERPL-SCNC: 101 MMOL/L (ref 98–107)
CHOLEST SERPL-MCNC: 220 MG/DL (ref 0–200)
CO2 SERPL-SCNC: 28.3 MMOL/L (ref 22–29)
CREAT SERPL-MCNC: 0.81 MG/DL (ref 0.57–1)
DEPRECATED RDW RBC AUTO: 38.1 FL (ref 37–54)
EGFRCR SERPLBLD CKD-EPI 2021: 81.7 ML/MIN/1.73
ERYTHROCYTE [DISTWIDTH] IN BLOOD BY AUTOMATED COUNT: 11.9 % (ref 12.3–15.4)
GLOBULIN UR ELPH-MCNC: 3.2 GM/DL
GLUCOSE SERPL-MCNC: 79 MG/DL (ref 65–99)
HBA1C MFR BLD: 6.4 % (ref 4.8–5.6)
HCT VFR BLD AUTO: 42.3 % (ref 34–46.6)
HDLC SERPL-MCNC: 51 MG/DL (ref 40–60)
HGB BLD-MCNC: 14.8 G/DL (ref 12–15.9)
LDLC SERPL CALC-MCNC: 145 MG/DL (ref 0–100)
LDLC/HDLC SERPL: 2.8 {RATIO}
MCH RBC QN AUTO: 30.2 PG (ref 26.6–33)
MCHC RBC AUTO-ENTMCNC: 35 G/DL (ref 31.5–35.7)
MCV RBC AUTO: 86.3 FL (ref 79–97)
PLATELET # BLD AUTO: 429 10*3/MM3 (ref 140–450)
PMV BLD AUTO: 9.6 FL (ref 6–12)
POTASSIUM SERPL-SCNC: 4 MMOL/L (ref 3.5–5.2)
PROT SERPL-MCNC: 8.1 G/DL (ref 6–8.5)
RBC # BLD AUTO: 4.9 10*6/MM3 (ref 3.77–5.28)
SODIUM SERPL-SCNC: 140 MMOL/L (ref 136–145)
TRIGL SERPL-MCNC: 132 MG/DL (ref 0–150)
TSH SERPL DL<=0.05 MIU/L-ACNC: 3.82 UIU/ML (ref 0.27–4.2)
VLDLC SERPL-MCNC: 24 MG/DL (ref 5–40)
WBC NRBC COR # BLD: 8.38 10*3/MM3 (ref 3.4–10.8)

## 2022-11-17 PROCEDURE — 99214 OFFICE O/P EST MOD 30 MIN: CPT | Performed by: STUDENT IN AN ORGANIZED HEALTH CARE EDUCATION/TRAINING PROGRAM

## 2022-11-17 PROCEDURE — 80053 COMPREHEN METABOLIC PANEL: CPT

## 2022-11-17 PROCEDURE — 80061 LIPID PANEL: CPT

## 2022-11-17 PROCEDURE — 82306 VITAMIN D 25 HYDROXY: CPT

## 2022-11-17 PROCEDURE — 85027 COMPLETE CBC AUTOMATED: CPT

## 2022-11-17 PROCEDURE — 83036 HEMOGLOBIN GLYCOSYLATED A1C: CPT

## 2022-11-17 PROCEDURE — 99396 PREV VISIT EST AGE 40-64: CPT | Performed by: STUDENT IN AN ORGANIZED HEALTH CARE EDUCATION/TRAINING PROGRAM

## 2022-11-17 PROCEDURE — 84443 ASSAY THYROID STIM HORMONE: CPT

## 2022-11-17 NOTE — ASSESSMENT & PLAN NOTE
Annual exam  Colonoscopy done 2020  Mammogram up to date .   Pap smear: she says she went to gynecology dr. tellez and had pelvic exam this year. Will request records.   Counseled on diet and exercise including limited sweets and sugars to focus on lean meat and vegetables. Counseled on 50 minutes of moderate exercise 3 times per week.   Recmmend dental and eye exam  hiv hep c screening: she declines  covid vaccine boosted  Flu vaccine up to date  Shingles: recommended   tdap recommended.

## 2022-11-17 NOTE — PROGRESS NOTES
"Chief Complaint  Annual Exam (Pt fell in October, ribs are still not healed(left breast area) , left arm pain from booster months ago )    History of Present Illness       Fall  She says she was hiking and stepped in a hole with her left foot and fell forward. She says her phone or hand hit her ribs on the left side and she still has pain from this . Happened on October. The pain is getting better she says. She says it is more so painful with picking something up. No sob hemoptysis fever.     She also says that she has some pain in her left deltoid since her covid booster. No lymphadenopathy.      Annual exam  Colonoscopy done 2020  Mammogram up to date .   Pap smear: she says she went to gynecology dr. tellez and had pelvic exam this year. Will request records.   Counseled on diet and exercise including limited sweets and sugars to focus on lean meat and vegetables. Counseled on 50 minutes of moderate exercise 3 times per week.   Recmmend dental and eye exam  hiv hep c screening: she declines  covid vaccine boosted  Flu vaccine up to date  Shingles: recommended   tdap recommended.           The following portions of the patient's history were reviewed and updated as appropriate: allergies, current medications, past family history, past medical history, past social history, past surgical history, and problem list.    OBJECTIVE:  /78   Pulse 75   Temp 97.8 °F (36.6 °C)   Resp 17   Ht 157.5 cm (62\")   Wt 79.2 kg (174 lb 9.6 oz)   SpO2 99%   BMI 31.93 kg/m²       Physical Exam  Constitutional:       General: She is not in acute distress.     Appearance: Normal appearance.   HENT:      Head: Normocephalic and atraumatic.   Eyes:      Extraocular Movements: Extraocular movements intact.   Cardiovascular:      Rate and Rhythm: Normal rate and regular rhythm.      Heart sounds: No murmur heard.  Pulmonary:      Effort: Pulmonary effort is normal. No respiratory distress.      Breath sounds: Normal breath " sounds. No stridor. No wheezing, rhonchi or rales.   Musculoskeletal:      Comments: Rib pain on palpation on rib 10 on the left.   No bruising  Normal rom  No lymphadenopathy of left axilla present.   Skin:     Findings: No rash.      Comments: Left deltoid without pain on palpation.   No redness swelling .  No lumps found  Muscle feels tight   Neurological:      General: No focal deficit present.      Mental Status: She is alert.   Psychiatric:         Mood and Affect: Mood normal.                    Assessment and Plan   Diagnoses and all orders for this visit:    1. Annual physical exam (Primary)  Assessment & Plan:  Annual exam  Colonoscopy done 2020  Mammogram up to date .   Pap smear: she says she went to gynecology dr. tellez and had pelvic exam this year. Will request records.   Counseled on diet and exercise including limited sweets and sugars to focus on lean meat and vegetables. Counseled on 50 minutes of moderate exercise 3 times per week.   Recmmend dental and eye exam  hiv hep c screening: she declines  covid vaccine boosted  Flu vaccine up to date  Shingles: recommended   tdap recommended.       2. Essential hypertension  -     CBC (No Diff); Future  -     Comprehensive Metabolic Panel; Future  -     TSH Rfx On Abnormal To Free T4; Future  -     Cancel: Vitamin B12; Future  -     Cancel: Vitamin D,25-Hydroxy; Future    3. Elevated LDL cholesterol level  -     Hemoglobin A1c; Future  -     Lipid Panel; Future    4. Encounter for screening mammogram for malignant neoplasm of breast  -     Cancel: Mammo screening digital tomosynthesis bilateral w CAD; Future    5. History of vitamin D deficiency  -     Vitamin D,25-Hydroxy; Future      Rib pain  Deltoid pain  Xray ribs given fall with rib pain. She says this is getting better.   Recommend ultrasound of deltoid. She would like to hold off at this time.       Return in about 6 months (around 5/17/2023).       Sandra Miranda D.O.  [facility]  [place]

## 2022-11-18 ENCOUNTER — TELEPHONE (OUTPATIENT)
Dept: FAMILY MEDICINE CLINIC | Facility: CLINIC | Age: 62
End: 2022-11-18

## 2022-11-18 NOTE — TELEPHONE ENCOUNTER
HUB OKAY TO READ!!    Sandra Miranda, DO l  Please let her know that her a1c shows prediabetic almost in the diabetic range and cholesterol is elevated which is a risk factor for heart attack stroke or vascular complications over time. Recommend diet with lean meat fish and vegetables and low sugar/carbs. Can refer to phone dietitian if this helps let me know. We can also start a medication called lipitor which can lower this risk. It can cause muscle pain or elevated liver tests for which we would monitor. Let me know if interested.

## 2022-11-18 NOTE — TELEPHONE ENCOUNTER
JesusAngela         12:50 PM  Note   ANKUSH OKAY TO READ!!     Kyra Mirandaly, DO l  Please let her know that her a1c shows prediabetic almost in the diabetic range and cholesterol is elevated which is a risk factor for heart attack stroke or vascular complications over time. Recommend diet with lean meat fish and vegetables and low sugar/carbs. Can refer to phone dietitian if this helps let me know. We can also start a medication called lipitor which can lower this risk. It can cause muscle pain or elevated liver tests for which we would monitor. Let me know if interested.            PATIENT INFORMED OF HUB TO READ. PATIENT WANTS TO KNOW IF SHE CAN SHE TAKE METFORMIN? NOT INTERESTED IN LIPITOR

## 2022-12-01 DIAGNOSIS — F41.9 ANXIETY: Chronic | ICD-10-CM

## 2022-12-01 DIAGNOSIS — I10 ESSENTIAL HYPERTENSION: ICD-10-CM

## 2022-12-01 RX ORDER — LOSARTAN POTASSIUM 50 MG/1
TABLET ORAL
Qty: 90 TABLET | Refills: 0 | Status: SHIPPED | OUTPATIENT
Start: 2022-12-01 | End: 2023-03-03 | Stop reason: SDUPTHER

## 2022-12-01 RX ORDER — CITALOPRAM 10 MG/1
TABLET ORAL
Qty: 90 TABLET | Refills: 0 | Status: SHIPPED | OUTPATIENT
Start: 2022-12-01 | End: 2023-03-09 | Stop reason: SDUPTHER

## 2022-12-01 RX ORDER — HYDROCHLOROTHIAZIDE 12.5 MG/1
TABLET ORAL
Qty: 90 TABLET | Refills: 0 | Status: SHIPPED | OUTPATIENT
Start: 2022-12-01 | End: 2023-03-09 | Stop reason: SDUPTHER

## 2023-02-01 ENCOUNTER — TELEPHONE (OUTPATIENT)
Dept: FAMILY MEDICINE CLINIC | Facility: CLINIC | Age: 63
End: 2023-02-01

## 2023-02-01 NOTE — TELEPHONE ENCOUNTER
It is okay for her to take mucinex. She also could consider taking paxlovid antiviral. If she is interested please have her set up a video visit with one of our providers. I am not in the rest of the week please send any response to on call.

## 2023-02-01 NOTE — TELEPHONE ENCOUNTER
"Hub please read: \"It is okay for her to take mucinex. She also could consider taking paxlovid antiviral. If she is interested please have her set up a video visit with one of our providers. I am not in the rest of the week please send any response to on call. \"  "

## 2023-02-01 NOTE — TELEPHONE ENCOUNTER
Caller: Gini Solorzano    Relationship to patient: Self    Best call back number: [phone] LEAVE VOICEMAIL IF NO ANSWER        Date of positive COVID19 test: 01/29/2023 WITH A HOME TEST         COVID19 symptoms: COUGH, STUFFY NOSE, RAW THROAT DUE TO COUGHING FATIGUE         Additional information or concerns: THE PATIENT WOULD LIKE TO KNOW IF SHE CAN TAKE MUCINEX DM OR IF SHE SHOULD TAKE ANOTHER MEDICATION PLEASE CALL THE PATIENT TO DISCUSS

## 2023-03-03 DIAGNOSIS — I10 ESSENTIAL HYPERTENSION: ICD-10-CM

## 2023-03-03 RX ORDER — LOSARTAN POTASSIUM 50 MG/1
50 TABLET ORAL DAILY
Qty: 90 TABLET | Refills: 0 | Status: SHIPPED | OUTPATIENT
Start: 2023-03-03

## 2023-03-09 DIAGNOSIS — F41.9 ANXIETY: Chronic | ICD-10-CM

## 2023-03-09 DIAGNOSIS — I10 ESSENTIAL HYPERTENSION: ICD-10-CM

## 2023-03-09 RX ORDER — CITALOPRAM 10 MG/1
10 TABLET ORAL DAILY
Qty: 90 TABLET | Refills: 0 | Status: SHIPPED | OUTPATIENT
Start: 2023-03-09

## 2023-03-09 RX ORDER — HYDROCHLOROTHIAZIDE 12.5 MG/1
12.5 TABLET ORAL DAILY
Qty: 90 TABLET | Refills: 0 | Status: SHIPPED | OUTPATIENT
Start: 2023-03-09

## 2023-05-02 ENCOUNTER — TELEPHONE (OUTPATIENT)
Dept: FAMILY MEDICINE CLINIC | Facility: CLINIC | Age: 63
End: 2023-05-02

## 2023-05-02 NOTE — TELEPHONE ENCOUNTER
Caller: Gini Solorzano    Relationship: Self    Best call back number: [phone]    What orders are you requesting (i.e. lab or imaging): A1C; LABS DISCUSSED AT APPOINTMENT ON 11/17/22 THAT PCP WANTED TO REPEAT    In what timeframe would the patient need to come in: BEFORE 6/5/23    Where will you receive your lab/imaging services: [place]    Additional notes: PATIENT STATES SHE WOULD LIKE TO HAVE LABS DRAWN PRIOR TO 6/5/23 SO SHE MAY DISCUSS LABS AT THE APPOINTMENT.

## 2023-05-03 DIAGNOSIS — Z00.00 ANNUAL PHYSICAL EXAM: Primary | ICD-10-CM

## 2023-05-31 DIAGNOSIS — I10 ESSENTIAL HYPERTENSION: ICD-10-CM

## 2023-05-31 RX ORDER — LOSARTAN POTASSIUM 50 MG/1
TABLET ORAL
Qty: 90 TABLET | Refills: 0 | Status: SHIPPED | OUTPATIENT
Start: 2023-05-31

## 2023-06-06 ENCOUNTER — LAB (OUTPATIENT)
Dept: LAB | Facility: HOSPITAL | Age: 63
End: 2023-06-06
Payer: COMMERCIAL

## 2023-06-06 DIAGNOSIS — Z00.00 ANNUAL PHYSICAL EXAM: ICD-10-CM

## 2023-06-06 LAB
25(OH)D3 SERPL-MCNC: 55.1 NG/ML (ref 30–100)
ALBUMIN SERPL-MCNC: 4.6 G/DL (ref 3.5–5.2)
ALBUMIN/GLOB SERPL: 1.5 G/DL
ALP SERPL-CCNC: 52 U/L (ref 39–117)
ALT SERPL W P-5'-P-CCNC: 19 U/L (ref 1–33)
ANION GAP SERPL CALCULATED.3IONS-SCNC: 13 MMOL/L (ref 5–15)
AST SERPL-CCNC: 25 U/L (ref 1–32)
BILIRUB SERPL-MCNC: 0.4 MG/DL (ref 0–1.2)
BUN SERPL-MCNC: 17 MG/DL (ref 8–23)
BUN/CREAT SERPL: 21.8 (ref 7–25)
CALCIUM SPEC-SCNC: 9.5 MG/DL (ref 8.6–10.5)
CHLORIDE SERPL-SCNC: 102 MMOL/L (ref 98–107)
CHOLEST SERPL-MCNC: 175 MG/DL (ref 0–200)
CO2 SERPL-SCNC: 24 MMOL/L (ref 22–29)
CREAT SERPL-MCNC: 0.78 MG/DL (ref 0.57–1)
DEPRECATED RDW RBC AUTO: 40.8 FL (ref 37–54)
EGFRCR SERPLBLD CKD-EPI 2021: 85.5 ML/MIN/1.73
ERYTHROCYTE [DISTWIDTH] IN BLOOD BY AUTOMATED COUNT: 12.7 % (ref 12.3–15.4)
GLOBULIN UR ELPH-MCNC: 3.1 GM/DL
GLUCOSE SERPL-MCNC: 105 MG/DL (ref 65–99)
HBA1C MFR BLD: 5.8 % (ref 4.8–5.6)
HCT VFR BLD AUTO: 43 % (ref 34–46.6)
HDLC SERPL-MCNC: 44 MG/DL (ref 40–60)
HGB BLD-MCNC: 14.5 G/DL (ref 12–15.9)
LDLC SERPL CALC-MCNC: 111 MG/DL (ref 0–100)
LDLC/HDLC SERPL: 2.49 {RATIO}
MCH RBC QN AUTO: 30 PG (ref 26.6–33)
MCHC RBC AUTO-ENTMCNC: 33.7 G/DL (ref 31.5–35.7)
MCV RBC AUTO: 88.8 FL (ref 79–97)
PLATELET # BLD AUTO: 389 10*3/MM3 (ref 140–450)
PMV BLD AUTO: 10 FL (ref 6–12)
POTASSIUM SERPL-SCNC: 4.2 MMOL/L (ref 3.5–5.2)
PROT SERPL-MCNC: 7.7 G/DL (ref 6–8.5)
RBC # BLD AUTO: 4.84 10*6/MM3 (ref 3.77–5.28)
SODIUM SERPL-SCNC: 139 MMOL/L (ref 136–145)
T4 FREE SERPL-MCNC: 1.16 NG/DL (ref 0.93–1.7)
TRIGL SERPL-MCNC: 108 MG/DL (ref 0–150)
TSH SERPL DL<=0.05 MIU/L-ACNC: 6.05 UIU/ML (ref 0.27–4.2)
VIT B12 BLD-MCNC: 390 PG/ML (ref 211–946)
VLDLC SERPL-MCNC: 20 MG/DL (ref 5–40)
WBC NRBC COR # BLD: 8.29 10*3/MM3 (ref 3.4–10.8)

## 2023-06-06 PROCEDURE — 83036 HEMOGLOBIN GLYCOSYLATED A1C: CPT

## 2023-06-06 PROCEDURE — 84439 ASSAY OF FREE THYROXINE: CPT

## 2023-06-06 PROCEDURE — 85027 COMPLETE CBC AUTOMATED: CPT

## 2023-06-06 PROCEDURE — 80061 LIPID PANEL: CPT

## 2023-06-06 PROCEDURE — 82306 VITAMIN D 25 HYDROXY: CPT

## 2023-06-06 PROCEDURE — 80053 COMPREHEN METABOLIC PANEL: CPT

## 2023-06-06 PROCEDURE — 82607 VITAMIN B-12: CPT

## 2023-06-06 PROCEDURE — 84443 ASSAY THYROID STIM HORMONE: CPT

## 2023-06-07 ENCOUNTER — TELEPHONE (OUTPATIENT)
Dept: FAMILY MEDICINE CLINIC | Facility: CLINIC | Age: 63
End: 2023-06-07
Payer: COMMERCIAL

## 2023-06-07 NOTE — PROGRESS NOTES
Please let the patient know that cholesterol is mildly elevated which is a risk factor for heart attack stroke or vascular complications over time. Recommend diet with lean meat fish and vegetables and decreased sugar/carbs with daily exercise. Can refer to phone dietitian if this helps let me know.     Please let the patient know that the diabetic test (a1c) is elevated into the prediabetic not diabetic range. Recommend diet with lean meat fish and vegetables and decreased sugar/carbs with daily exercise. Can refer to phone dietitian if this helps let me know. Recommend to recheck this in 3-6 months.       Please let her know that her labs show thyroid level is lower than goal. If she has any symptoms of fatigue, hair loss, dry skin , constipation let me know, we may consider a medication for this. Otherwise please come in to check this in 3-6 mo.

## 2023-06-07 NOTE — TELEPHONE ENCOUNTER
----- Message from Sandra Miranda DO sent at 6/7/2023 10:13 AM EDT -----  Please let the patient know that cholesterol is mildly elevated which is a risk factor for heart attack stroke or vascular complications over time. Recommend diet with lean meat fish and vegetables and decreased sugar/carbs with daily exercise. Can refer to phone dietitian if this helps let me know.     Please let the patient know that the diabetic test (a1c) is elevated into the prediabetic not diabetic range. Recommend diet with lean meat fish and vegetables and decreased sugar/carbs with daily exercise. Can refer to phone dietitian if this helps let me know. Recommend to recheck this in 3-6 months.       Please let her know that her labs show thyroid level is lower than goal. If she has any symptoms of fatigue, hair loss, dry skin , constipation let me know, we may consider a medication for this. Otherwise please come in to check this in 3-6 mo.

## 2023-08-21 DIAGNOSIS — I10 ESSENTIAL HYPERTENSION: ICD-10-CM

## 2023-08-21 RX ORDER — LOSARTAN POTASSIUM 50 MG/1
TABLET ORAL
Qty: 90 TABLET | Refills: 0 | Status: SHIPPED | OUTPATIENT
Start: 2023-08-21

## 2023-11-10 DIAGNOSIS — I10 ESSENTIAL HYPERTENSION: ICD-10-CM

## 2023-11-10 RX ORDER — LOSARTAN POTASSIUM 50 MG/1
TABLET ORAL
Qty: 90 TABLET | Refills: 0 | Status: SHIPPED | OUTPATIENT
Start: 2023-11-10

## 2023-11-19 DIAGNOSIS — I10 ESSENTIAL HYPERTENSION: ICD-10-CM

## 2023-11-20 RX ORDER — LOSARTAN POTASSIUM 50 MG/1
TABLET ORAL
Qty: 90 TABLET | Refills: 0 | Status: SHIPPED | OUTPATIENT
Start: 2023-11-20

## 2023-11-20 NOTE — TELEPHONE ENCOUNTER
Rx Refill Note  Requested Prescriptions     Pending Prescriptions Disp Refills    losartan (COZAAR) 50 MG tablet [Pharmacy Med Name: Losartan Potassium 50 MG Oral Tablet] 90 tablet 0     Sig: Take 1 tablet by mouth once daily      Last office visit with prescribing clinician: 11/17/2022   Last telemedicine visit with prescribing clinician: Visit date not found   Next office visit with prescribing clinician: Visit date not found                         Would you like a call back once the refill request has been completed: [] Yes [] No    If the office needs to give you a call back, can they leave a voicemail: [] Yes [] No    Karen Ventura MA  11/20/23, 10:54 EST

## 2023-12-21 ENCOUNTER — TELEPHONE (OUTPATIENT)
Dept: FAMILY MEDICINE CLINIC | Facility: CLINIC | Age: 63
End: 2023-12-21

## 2023-12-21 NOTE — TELEPHONE ENCOUNTER
Caller: Gini Solorzano    Relationship: Self    Best call back number:  [phone]     Who are you requesting to speak with (clinical staff, provider,  specific staff member): CLINICAL       What was the call regarding: WHEN DOES SHE NEED TO COME BACK IN TO HAVE HER BLOOD SUGAR CHECKED     PATIENT CANCELED HER APPOINTMENT  ON 12-22-23 BECAUSE SHE SAID HER BLOOD PRESSURE IS FINE 137/81   SHE REALIZED SHE HAD THE CUFF ON UPSIDE DOWN    PLEASE CALL AND ADVISE

## 2024-04-19 ENCOUNTER — TELEPHONE (OUTPATIENT)
Dept: FAMILY MEDICINE CLINIC | Facility: CLINIC | Age: 64
End: 2024-04-19
Payer: COMMERCIAL

## 2024-04-19 DIAGNOSIS — Z00.00 ANNUAL PHYSICAL EXAM: Primary | ICD-10-CM

## 2024-04-19 NOTE — TELEPHONE ENCOUNTER
Caller: Gini Solorzano    Relationship: Self    Best call back number: [phone]    What orders are you requesting (i.e. lab or imaging): COMPLETE LABS    In what timeframe would the patient need to come in: NEXT WEEK    Where will you receive your lab/imaging services: [facility]    Additional notes: PATIENT WOULD LIKE TO HAVE LABS DRAWN SO THAT RESULTS WILL BE BACK IN TIME FOR HER PHYSICAL APPOINTMENT

## 2024-04-19 NOTE — TELEPHONE ENCOUNTER
HUB TO RELAY    LVM. Please let patient know labs have been ordered and she can go to any [facility].

## 2024-04-26 ENCOUNTER — LAB (OUTPATIENT)
Dept: LAB | Facility: HOSPITAL | Age: 64
End: 2024-04-26
Payer: COMMERCIAL

## 2024-04-26 DIAGNOSIS — Z00.00 ANNUAL PHYSICAL EXAM: ICD-10-CM

## 2024-04-26 LAB
25(OH)D3 SERPL-MCNC: 77.6 NG/ML (ref 30–100)
ALBUMIN SERPL-MCNC: 4.5 G/DL (ref 3.5–5.2)
ALBUMIN/GLOB SERPL: 1.5 G/DL
ALP SERPL-CCNC: 59 U/L (ref 39–117)
ALT SERPL W P-5'-P-CCNC: 14 U/L (ref 1–33)
ANION GAP SERPL CALCULATED.3IONS-SCNC: 13.3 MMOL/L (ref 5–15)
AST SERPL-CCNC: 23 U/L (ref 1–32)
BILIRUB SERPL-MCNC: 0.6 MG/DL (ref 0–1.2)
BUN SERPL-MCNC: 13 MG/DL (ref 8–23)
BUN/CREAT SERPL: 18.8 (ref 7–25)
CALCIUM SPEC-SCNC: 9.9 MG/DL (ref 8.6–10.5)
CHLORIDE SERPL-SCNC: 101 MMOL/L (ref 98–107)
CHOLEST SERPL-MCNC: 207 MG/DL (ref 0–200)
CO2 SERPL-SCNC: 24.7 MMOL/L (ref 22–29)
CREAT SERPL-MCNC: 0.69 MG/DL (ref 0.57–1)
DEPRECATED RDW RBC AUTO: 40.2 FL (ref 37–54)
EGFRCR SERPLBLD CKD-EPI 2021: 97.1 ML/MIN/1.73
ERYTHROCYTE [DISTWIDTH] IN BLOOD BY AUTOMATED COUNT: 12.6 % (ref 12.3–15.4)
GLOBULIN UR ELPH-MCNC: 3.1 GM/DL
GLUCOSE SERPL-MCNC: 118 MG/DL (ref 65–99)
HBA1C MFR BLD: 5.8 % (ref 4.8–5.6)
HCT VFR BLD AUTO: 43.8 % (ref 34–46.6)
HDLC SERPL-MCNC: 52 MG/DL (ref 40–60)
HGB BLD-MCNC: 14.8 G/DL (ref 12–15.9)
LDLC SERPL CALC-MCNC: 138 MG/DL (ref 0–100)
LDLC/HDLC SERPL: 2.61 {RATIO}
MCH RBC QN AUTO: 29.6 PG (ref 26.6–33)
MCHC RBC AUTO-ENTMCNC: 33.8 G/DL (ref 31.5–35.7)
MCV RBC AUTO: 87.6 FL (ref 79–97)
PLATELET # BLD AUTO: 410 10*3/MM3 (ref 140–450)
PMV BLD AUTO: 9.4 FL (ref 6–12)
POTASSIUM SERPL-SCNC: 4.2 MMOL/L (ref 3.5–5.2)
PROT SERPL-MCNC: 7.6 G/DL (ref 6–8.5)
RBC # BLD AUTO: 5 10*6/MM3 (ref 3.77–5.28)
SODIUM SERPL-SCNC: 139 MMOL/L (ref 136–145)
TRIGL SERPL-MCNC: 97 MG/DL (ref 0–150)
TSH SERPL DL<=0.05 MIU/L-ACNC: 3.3 UIU/ML (ref 0.27–4.2)
VIT B12 BLD-MCNC: 459 PG/ML (ref 211–946)
VLDLC SERPL-MCNC: 17 MG/DL (ref 5–40)
WBC NRBC COR # BLD AUTO: 8.55 10*3/MM3 (ref 3.4–10.8)

## 2024-04-26 PROCEDURE — 85027 COMPLETE CBC AUTOMATED: CPT

## 2024-04-26 PROCEDURE — 80053 COMPREHEN METABOLIC PANEL: CPT

## 2024-04-26 PROCEDURE — 80061 LIPID PANEL: CPT

## 2024-04-26 PROCEDURE — 82607 VITAMIN B-12: CPT

## 2024-04-26 PROCEDURE — 83036 HEMOGLOBIN GLYCOSYLATED A1C: CPT

## 2024-04-26 PROCEDURE — 84443 ASSAY THYROID STIM HORMONE: CPT

## 2024-04-26 PROCEDURE — 82306 VITAMIN D 25 HYDROXY: CPT

## 2024-04-29 ENCOUNTER — TELEPHONE (OUTPATIENT)
Dept: FAMILY MEDICINE CLINIC | Facility: CLINIC | Age: 64
End: 2024-04-29
Payer: COMMERCIAL

## 2024-04-29 NOTE — PROGRESS NOTES
Please let the patient know that cholesterol is elevated which is a risk factor for heart attack stroke or vascular complications over time. Recommend diet with lean meat fish and vegetables and decreased sugar/carbs with daily exercise. Can refer to phone dietitian if this helps let me know.     Please let the patient know that the diabetic test (a1c) is elevated into the prediabetic not diabetic range. Recommend diet with lean meat fish and vegetables and decreased sugar/carbs with daily exercise. Can refer to phone dietitian if this helps let me know. Recommend to recheck this in 3-6 months.

## 2024-04-29 NOTE — TELEPHONE ENCOUNTER
----- Message from Parker IRWIN sent at 4/29/2024  9:02 AM EDT -----  Please let the patient know that cholesterol is elevated which is a risk factor for heart attack stroke or vascular complications over time. Recommend diet with lean meat fish and vegetables and decreased sugar/carbs with daily exercise. Can refer to phone dietitian if this helps let me know.     Please let the patient know that the diabetic test (a1c) is elevated into the prediabetic not diabetic range. Recommend diet with lean meat fish and vegetables and decreased sugar/carbs with daily exercise. Can refer to phone dietitian if this helps let me know. Recommend to recheck this in 3-6 months.

## 2024-04-29 NOTE — TELEPHONE ENCOUNTER
HUB TO RELAY    LVM. Please let the patient know that cholesterol is elevated which is a risk factor for heart attack stroke or vascular complications over time. Recommend diet with lean meat fish and vegetables and decreased sugar/carbs with daily exercise. Can refer to phone dietitian if this helps let me know.      Please let the patient know that the diabetic test (a1c) is elevated into the prediabetic not diabetic range. Recommend diet with lean meat fish and vegetables and decreased sugar/carbs with daily exercise. Can refer to phone dietitian if this helps let me know. Recommend to recheck this in 3-6 months.

## 2024-04-30 NOTE — TELEPHONE ENCOUNTER
Patient notified of providers results message and verbalized understanding. Patient denies any further needs at this time.

## 2024-05-02 ENCOUNTER — OFFICE VISIT (OUTPATIENT)
Dept: FAMILY MEDICINE CLINIC | Facility: CLINIC | Age: 64
End: 2024-05-02
Payer: COMMERCIAL

## 2024-05-02 VITALS
BODY MASS INDEX: 31.03 KG/M2 | OXYGEN SATURATION: 98 % | WEIGHT: 168.6 LBS | RESPIRATION RATE: 20 BRPM | DIASTOLIC BLOOD PRESSURE: 86 MMHG | TEMPERATURE: 98.4 F | SYSTOLIC BLOOD PRESSURE: 124 MMHG | HEIGHT: 62 IN | HEART RATE: 76 BPM

## 2024-05-02 DIAGNOSIS — Z00.00 ANNUAL PHYSICAL EXAM: ICD-10-CM

## 2024-05-02 DIAGNOSIS — I10 ESSENTIAL HYPERTENSION: ICD-10-CM

## 2024-05-02 DIAGNOSIS — Z13.820 ENCOUNTER FOR OSTEOPOROSIS SCREENING IN ASYMPTOMATIC POSTMENOPAUSAL PATIENT: ICD-10-CM

## 2024-05-02 DIAGNOSIS — Z12.31 ENCOUNTER FOR SCREENING MAMMOGRAM FOR MALIGNANT NEOPLASM OF BREAST: Primary | ICD-10-CM

## 2024-05-02 DIAGNOSIS — Z78.0 ENCOUNTER FOR OSTEOPOROSIS SCREENING IN ASYMPTOMATIC POSTMENOPAUSAL PATIENT: ICD-10-CM

## 2024-05-02 RX ORDER — METFORMIN HYDROCHLORIDE 500 MG/1
500 TABLET, EXTENDED RELEASE ORAL
Qty: 90 TABLET | Refills: 0 | Status: SHIPPED | OUTPATIENT
Start: 2024-05-02

## 2024-05-02 RX ORDER — ESTRADIOL 10 UG/1
1 TABLET VAGINAL DAILY
COMMUNITY
Start: 2024-04-05

## 2024-05-02 RX ORDER — LOSARTAN POTASSIUM 50 MG/1
50 TABLET ORAL DAILY
Qty: 90 TABLET | Refills: 0 | Status: SHIPPED | OUTPATIENT
Start: 2024-05-02

## 2024-05-02 NOTE — LETTER
[facility]  Vaccine Consent Form    Patient Name:  Gini Solorzano  Patient :  1960     Vaccine(s) Ordered    Tdap Vaccine Greater Than or Equal To 6yo IM        Screening Checklist  The following questions should be completed prior to vaccination. If you answer “yes” to any question, it does not necessarily mean you should not be vaccinated. It just means we may need to clarify or ask more questions. If a question is unclear, please ask your healthcare provider to explain it.    Yes No   Any fever or moderate to severe illness today (mild illness and/or antibiotic treatment are not contraindications)?     Do you have a history of a serious reaction to any previous vaccinations, such as anaphylaxis, encephalopathy within 7 days, Guillain-[place] syndrome within 6 weeks, seizure?     Have you received any live vaccine(s) (e.g MMR, ESTEBAN) or any other vaccines in the last month (to ensure duplicate doses aren't given)?     Do you have an anaphylactic allergy to latex (DTaP, DTaP-IPV, Hep A, Hep B, MenB, RV, Td, Tdap), baker’s yeast (Hep B, HPV), polysorbates (RSV, nirsevimab, PCV 20, Rotavirrus, Tdap, Shingrix), or gelatin (ESTEBAN, MMR)?     Do you have an anaphylactic allergy to neomycin (Rabies, ESTEBAN, MMR, IPV, Hep A), polymyxin B (IPV), or streptomycin (IPV)?      Any cancer, leukemia, AIDS, or other immune system disorder? (ESTEBAN, MMR, RV)     Do you have a parent, brother, or sister with an immune system problem (if immune competence of vaccine recipient clinically verified, can proceed)? (MMR, ESTEBAN)     Any recent steroid treatments for >2 weeks, chemotherapy, or radiation treatment? (ESTEBAN, MMR)     Have you received antibody-containing blood transfusions or IVIG in the past 11 months (recommended interval is dependent on product)? (MMR, ESTEABN)     Have you taken antiviral drugs (acyclovir, famciclovir, valacyclovir for ESTEBAN) in the last 24 or 48 hours, respectively?      Are you pregnant or planning to become pregnant  "within 1 month? (ESTEBAN, MMR, HPV, IPV, MenB, Abrexvy; For Hep B- refer to Engerix-B; For RSV - Abrysvo is indicated for 32-36 weeks of pregnancy from September to January)     For infants, have you ever been told your child has had intussusception or a medical emergency involving obstruction of the intestine (Rotavirus)? If not for an infant, can skip this question.         *Ordering Physicians/APC should be consulted if \"yes\" is checked by the patient or guardian above.  I have received, read, and understand the Vaccine Information Statement (VIS) for each vaccine ordered.  I have considered my or my child's health status as well as the health status of my close contacts.  I have taken the opportunity to discuss my vaccine questions with my or my child's health care provider.   I have requested that the ordered vaccine(s) be given to me or my child.  I understand the benefits and risks of the vaccines.  I understand that I should remain in the clinic for 15 minutes after receiving the vaccine(s).  _________________________________________________________  Signature of Patient or Parent/Legal Guardian ____________________  Date     "

## 2024-05-02 NOTE — PROGRESS NOTES
"Chief Complaint  Annual Exam    History of Present Illness      Annual exam  Colonoscopy up to date , recommended next year for now. Will request biopsy result to confirm.   Mammogram ordered.   Pap smear: followed once yearly by obgyn ([facility], gets yearly pelvic)  dexa scan recommended after birthday in August.   Counseled on diet and exercise including limited sweets and sugars to focus on lean meat and vegetables. Counseled on 50 minutes of moderate exercise 3 times per week.   Recommend dental and eye exam  hiv hep c sti screening: she declines  Vaccines recommended:  covid vaccine  Rsv vaccine  Shingles  Tdap      Htn  Tolerating losartan  Taking bp at home occasionally but not lately  No s/e from losartan      She stopped the ssri because she is feeling better and it gave her a low grade headache that resolved when she stopped it.       The following portions of the patient's history were reviewed and updated as appropriate: allergies, current medications, past family history, past medical history, past social history, past surgical history, and problem list.    OBJECTIVE:  /86   Pulse 76   Temp 98.4 °F (36.9 °C) (Temporal)   Resp 20   Ht 157.5 cm (62.01\")   Wt 76.5 kg (168 lb 9.6 oz)   SpO2 98%   BMI 30.83 kg/m²       Physical Exam  Constitutional:       General: She is not in acute distress.     Appearance: Normal appearance.   HENT:      Head: Normocephalic and atraumatic.   Eyes:      Extraocular Movements: Extraocular movements intact.   Cardiovascular:      Rate and Rhythm: Normal rate and regular rhythm.      Heart sounds: No murmur heard.  Pulmonary:      Effort: Pulmonary effort is normal. No respiratory distress.      Breath sounds: Normal breath sounds. No stridor. No wheezing, rhonchi or rales.   Skin:     Findings: No rash.   Neurological:      General: No focal deficit present.      Mental Status: She is alert.   Psychiatric:         Mood and Affect: Mood normal.            "       Assessment and Plan   Diagnoses and all orders for this visit:    1. Encounter for screening mammogram for malignant neoplasm of breast (Primary)  -     Mammo screening digital tomosynthesis bilateral w CAD; Future    2. Encounter for osteoporosis screening in asymptomatic postmenopausal patient  -     DEXA Bone Density Axial; Future    3. Annual physical exam  -     Cancel: CBC (No Diff); Future  -     Cancel: Comprehensive Metabolic Panel; Future  -     Cancel: Hemoglobin A1c; Future  -     Cancel: Lipid Panel; Future  -     Cancel: TSH Rfx On Abnormal To Free T4; Future  -     Cancel: Vitamin B12; Future  -     Cancel: Vitamin D,25-Hydroxy; Future    4. Essential hypertension  -     losartan (COZAAR) 50 MG tablet; Take 1 tablet by mouth Daily.  Dispense: 90 tablet; Refill: 0    Other orders  -     metFORMIN ER (GLUCOPHAGE-XR) 500 MG 24 hr tablet; Take 1 tablet by mouth Daily With Breakfast.  Dispense: 90 tablet; Refill: 0          Annual exam  Colonoscopy up to date , recommended next year for now. Will request biopsy result to confirm.   Mammogram ordered.   Pap smear: followed once yearly by obgyn ([facility], gets yearly pelvic)  dexa scan recommended after birthday in August.   Counseled on diet and exercise including limited sweets and sugars to focus on lean meat and vegetables. Counseled on 50 minutes of moderate exercise 3 times per week.   Recommend dental and eye exam  hiv hep c sti screening: she declines  Vaccines recommended:  covid vaccine  Rsv vaccine  Shingles  Tdap          Depression, controlled off meds  With side effect of headache on ssri  We discussed possible elavil, she will consider.       Predm  Not conrtolled  Start metformin  Counseled on possible diarrhea or belly pain/nausea. Hold for any decrease in kidney function.      Hld  We discussed possible medication for this .   She will consider.     Return in about 3 months (around 8/2/2024).       Sandra Miranda D.O.  [facility] [place]

## 2024-06-07 ENCOUNTER — PATIENT MESSAGE (OUTPATIENT)
Dept: FAMILY MEDICINE CLINIC | Facility: CLINIC | Age: 64
End: 2024-06-07
Payer: COMMERCIAL

## 2024-06-24 ENCOUNTER — OFFICE VISIT (OUTPATIENT)
Dept: FAMILY MEDICINE CLINIC | Facility: CLINIC | Age: 64
End: 2024-06-24
Payer: COMMERCIAL

## 2024-06-24 VITALS
TEMPERATURE: 98 F | BODY MASS INDEX: 31.2 KG/M2 | HEART RATE: 81 BPM | SYSTOLIC BLOOD PRESSURE: 130 MMHG | DIASTOLIC BLOOD PRESSURE: 82 MMHG | OXYGEN SATURATION: 100 % | RESPIRATION RATE: 21 BRPM | WEIGHT: 170.6 LBS

## 2024-06-24 DIAGNOSIS — Z53.21 PATIENT LEFT WITHOUT BEING SEEN: Primary | ICD-10-CM

## 2024-06-24 RX ORDER — MULTIPLE VITAMINS W/ MINERALS TAB 9MG-400MCG
1 TAB ORAL DAILY
COMMUNITY

## 2024-06-25 ENCOUNTER — OFFICE VISIT (OUTPATIENT)
Dept: FAMILY MEDICINE CLINIC | Facility: CLINIC | Age: 64
End: 2024-06-25
Payer: COMMERCIAL

## 2024-06-25 VITALS
DIASTOLIC BLOOD PRESSURE: 78 MMHG | BODY MASS INDEX: 31.45 KG/M2 | OXYGEN SATURATION: 96 % | WEIGHT: 170.9 LBS | HEART RATE: 77 BPM | SYSTOLIC BLOOD PRESSURE: 127 MMHG | HEIGHT: 62 IN | TEMPERATURE: 98.6 F

## 2024-06-25 DIAGNOSIS — N76.2 CELLULITIS OF LABIA: Primary | ICD-10-CM

## 2024-06-25 PROCEDURE — 99213 OFFICE O/P EST LOW 20 MIN: CPT | Performed by: FAMILY MEDICINE

## 2024-06-25 RX ORDER — SULFAMETHOXAZOLE AND TRIMETHOPRIM 800; 160 MG/1; MG/1
1 TABLET ORAL 2 TIMES DAILY
Qty: 14 TABLET | Refills: 0 | Status: SHIPPED | OUTPATIENT
Start: 2024-06-25 | End: 2024-07-02

## 2024-06-25 NOTE — PROGRESS NOTES
Subjective     Chief Complaint  Cyst (Per patient she has a vaginal cyst; states it is getting bigger and it is leaking. Patient states she has had it since Saturday morning. )    Subjective          Gini Solorzano is a 63 y.o. female who presents today to [facility] for initial evaluation .    HPI:   History of Present Illness    Ms. Solorzano is a pleasant 63 year old female who presents today with a cyst in the vaginal area. This has been present for the last 5 days. She put a warm compress on the area and it helped some. Then there was some brownish to clear drainage from the area. However, over the last day it has gotten bigger, the area is very tender. She has never had this issue before. She has not had a fever.     The following portions of the patient's history were reviewed and updated as appropriate: allergies, current medications, past family history, past medical history, past social history, past surgical history and problem list.    Objective     Objective     Allergy:   Allergies   Allergen Reactions    Thiopental Other (See Comments)     Convulsions    Other      Unknown medication given when patient had wisdom teeth out        Current Medications:   Current Outpatient Medications   Medication Sig Dispense Refill    losartan (COZAAR) 50 MG tablet Take 1 tablet by mouth Daily. 90 tablet 0    metFORMIN ER (GLUCOPHAGE-XR) 500 MG 24 hr tablet Take 1 tablet by mouth Daily With Breakfast. 90 tablet 0    multivitamin with minerals tablet tablet Take 1 tablet by mouth Daily.      Yuvafem 10 MCG tablet vaginal tablet Insert 1 tablet into the vagina Daily.      sulfamethoxazole-trimethoprim (BACTRIM DS,SEPTRA DS) 800-160 MG per tablet Take 1 tablet by mouth 2 (Two) Times a Day for 7 days. 14 tablet 0     No current facility-administered medications for this visit.       Past Medical History:  Past Medical History:   Diagnosis Date    Hypertension        Past Surgical  "History:  Past Surgical History:   Procedure Laterality Date    MOUTH SURGERY         Social History:  Social History     Socioeconomic History    Marital status:    Tobacco Use    Smoking status: Never    Smokeless tobacco: Never   Vaping Use    Vaping status: Never Used   Substance and Sexual Activity    Alcohol use: No    Drug use: No    Sexual activity: Defer       Family History:  Family History   Problem Relation Age of Onset    Cancer Mother         lung    Hypertension Father     Hypertension Brother      Vital Signs:   /78   Pulse 77   Temp 98.6 °F (37 °C) (Infrared)   Ht 157.5 cm (62.01\")   Wt 77.5 kg (170 lb 14.4 oz)   SpO2 96%   BMI 31.25 kg/m²      Physical Exam:  Physical Exam  Vitals reviewed. Exam conducted with a chaperone present.   Constitutional:       Appearance: She is not ill-appearing.   Cardiovascular:      Rate and Rhythm: Normal rate.   Pulmonary:      Effort: Pulmonary effort is normal.   Genitourinary:     Labia:         Right: Lesion present.       Comments: Folliculitis of the right labia majora.  No evidence of abscess that needs I&D.  Some erythema and tenderness.  Neurological:      General: No focal deficit present.      Mental Status: She is alert. Mental status is at baseline.   Psychiatric:         Mood and Affect: Mood normal.         Thought Content: Thought content normal.               PHQ-9 Score  PHQ-9 Total Score:       Lab Review  Lab on 04/26/2024   Component Date Value Ref Range Status    WBC 04/26/2024 8.55  3.40 - 10.80 10*3/mm3 Final    RBC 04/26/2024 5.00  3.77 - 5.28 10*6/mm3 Final    Hemoglobin 04/26/2024 14.8  12.0 - 15.9 g/dL Final    Hematocrit 04/26/2024 43.8  34.0 - 46.6 % Final    MCV 04/26/2024 87.6  79.0 - 97.0 fL Final    MCH 04/26/2024 29.6  26.6 - 33.0 pg Final    MCHC 04/26/2024 33.8  31.5 - 35.7 g/dL Final    RDW 04/26/2024 12.6  12.3 - 15.4 % Final    RDW-SD 04/26/2024 40.2  37.0 - 54.0 fl Final    MPV 04/26/2024 9.4  6.0 - 12.0 " fL Final    Platelets 04/26/2024 410  140 - 450 10*3/mm3 Final    Glucose 04/26/2024 118 (H)  65 - 99 mg/dL Final    BUN 04/26/2024 13  8 - 23 mg/dL Final    Creatinine 04/26/2024 0.69  0.57 - 1.00 mg/dL Final    Sodium 04/26/2024 139  136 - 145 mmol/L Final    Potassium 04/26/2024 4.2  3.5 - 5.2 mmol/L Final    Chloride 04/26/2024 101  98 - 107 mmol/L Final    CO2 04/26/2024 24.7  22.0 - 29.0 mmol/L Final    Calcium 04/26/2024 9.9  8.6 - 10.5 mg/dL Final    Total Protein 04/26/2024 7.6  6.0 - 8.5 g/dL Final    Albumin 04/26/2024 4.5  3.5 - 5.2 g/dL Final    ALT (SGPT) 04/26/2024 14  1 - 33 U/L Final    AST (SGOT) 04/26/2024 23  1 - 32 U/L Final    Alkaline Phosphatase 04/26/2024 59  39 - 117 U/L Final    Total Bilirubin 04/26/2024 0.6  0.0 - 1.2 mg/dL Final    Globulin 04/26/2024 3.1  gm/dL Final    A/G Ratio 04/26/2024 1.5  g/dL Final    BUN/Creatinine Ratio 04/26/2024 18.8  7.0 - 25.0 Final    Anion Gap 04/26/2024 13.3  5.0 - 15.0 mmol/L Final    eGFR 04/26/2024 97.1  >60.0 mL/min/1.73 Final    Hemoglobin A1C 04/26/2024 5.80 (H)  4.80 - 5.60 % Final    Total Cholesterol 04/26/2024 207 (H)  0 - 200 mg/dL Final    Triglycerides 04/26/2024 97  0 - 150 mg/dL Final    HDL Cholesterol 04/26/2024 52  40 - 60 mg/dL Final    LDL Cholesterol  04/26/2024 138 (H)  0 - 100 mg/dL Final    VLDL Cholesterol 04/26/2024 17  5 - 40 mg/dL Final    LDL/HDL Ratio 04/26/2024 2.61   Final    TSH 04/26/2024 3.300  0.270 - 4.200 uIU/mL Final    Vitamin B-12 04/26/2024 459  211 - 946 pg/mL Final    25 Hydroxy, Vitamin D 04/26/2024 77.6  30.0 - 100.0 ng/ml Final        Radiology Results        Assessment / Plan         Assessment and Plan   Diagnoses and all orders for this visit:    1. Cellulitis of labia (Primary)  Assessment & Plan:  Likely initially a infected hair follicle.  Treating with Bactrim x 7 days.  Discussed continued warm compresses and sitz bath.    Orders:  -     sulfamethoxazole-trimethoprim (BACTRIM DS,SEPTRA DS) 800-160  MG per tablet; Take 1 tablet by mouth 2 (Two) Times a Day for 7 days.  Dispense: 14 tablet; Refill: 0        Discussed possible differential diagnoses, testing, treatment, recommended non-pharmacological interventions, risks, warning signs to monitor for that would indicate need for follow-up in clinic or ER. If no improvement with these regimens or you have new or worsening symptoms follow-up. Patient verbalizes understanding and agreement with plan of care. Denies further needs or concerns.     Patient was given instructions and counseling regarding her condition and for health maintenance advised.            BMI is >= 30 and <35. (Class 1 Obesity). The following options were offered after discussion;: weight loss educational material (shared in after visit summary)           Health Maintenance  Health Maintenance:   Health Maintenance Due   Topic Date Due    PAP SMEAR  08/29/2019    MAMMOGRAM  08/19/2024        Meds ordered during this visit  New Medications Ordered This Visit   Medications    sulfamethoxazole-trimethoprim (BACTRIM DS,SEPTRA DS) 800-160 MG per tablet     Sig: Take 1 tablet by mouth 2 (Two) Times a Day for 7 days.     Dispense:  14 tablet     Refill:  0       Meds stopped during this visit:  There are no discontinued medications.     Visit Diagnoses    ICD-10-CM ICD-9-CM   1. Cellulitis of labia  N76.2 616.10       Patient was given instructions and counseling regarding her condition or for health maintenance advice. Please see specific information pulled into the AVS if appropriate.     Follow Up   Return if symptoms worsen or fail to improve.      This document has been electronically signed by Pari Peña DO   June 25, 2024 10:29 EDT    Dictated Utilizing Dragon Dictation: Part of this note may be an electronic transcription/translation of spoken language to printed text using the Dragon Dictation System.    Pari Peña D.O.  [facility]

## 2024-06-25 NOTE — ASSESSMENT & PLAN NOTE
Likely initially a infected hair follicle.  Treating with Bactrim x 7 days.  Discussed continued warm compresses and sitz bath.

## 2024-07-09 ENCOUNTER — TELEPHONE (OUTPATIENT)
Dept: FAMILY MEDICINE CLINIC | Facility: CLINIC | Age: 64
End: 2024-07-09
Payer: COMMERCIAL

## 2024-07-09 NOTE — TELEPHONE ENCOUNTER
I called and spoke with Ms. Jeanine to remind her of her pending Bone Density and Mammogram that Dr. Miranda ordered for her on 05/02/24. I gave her the scheduling phone number.

## 2024-08-03 DIAGNOSIS — I10 ESSENTIAL HYPERTENSION: ICD-10-CM

## 2024-08-03 RX ORDER — LOSARTAN POTASSIUM 50 MG/1
50 TABLET ORAL DAILY
Qty: 90 TABLET | Refills: 0 | Status: SHIPPED | OUTPATIENT
Start: 2024-08-03

## 2024-08-09 ENCOUNTER — TELEPHONE (OUTPATIENT)
Dept: FAMILY MEDICINE CLINIC | Facility: CLINIC | Age: 64
End: 2024-08-09
Payer: COMMERCIAL

## 2024-08-09 RX ORDER — METFORMIN HYDROCHLORIDE 500 MG/1
500 TABLET, EXTENDED RELEASE ORAL
Qty: 90 TABLET | Refills: 0 | Status: SHIPPED | OUTPATIENT
Start: 2024-08-09

## 2024-08-09 NOTE — TELEPHONE ENCOUNTER
Caller: Shawn Solorzano    Relationship: Self    Best call back number:[phone]     What is the best time to reach you: TODAY    Who are you requesting to speak with (clinical staff, provider,  specific staff member): PCP/MA    Do you know the name of the person who called: SHAWN    What was the call regarding: PATIENT NEEDS A CALLBACK TO ADVISE WHEN SHE NEEDS TO MAKE A FOLLOW UP APPT AND IF SHE NEEDS TO HAVE LABS DONE    Is it okay if the provider responds through MyChart: CALLBACK

## 2024-08-09 NOTE — TELEPHONE ENCOUNTER
Patient notified of providers response message and verbalized understanding. Scheduled for 8/19/24 at 1400.

## 2024-08-09 NOTE — TELEPHONE ENCOUNTER
Caller: Gini Solorzano    Relationship: Self    Best call back number: [phone]     Requested Prescriptions:   Requested Prescriptions     Pending Prescriptions Disp Refills    metFORMIN ER (GLUCOPHAGE-XR) 500 MG 24 hr tablet 90 tablet 0     Sig: Take 1 tablet by mouth Daily With Breakfast.        Pharmacy where request should be sent: [place] [address] [facility] [phone] [facility] [phone] FX     Last office visit with prescribing clinician: 5/2/2024   Last telemedicine visit with prescribing clinician: Visit date not found   Next office visit with prescribing clinician: Visit date not found     Additional details provided by patient: ONLY HAS 2 PILLS. NEED FILLED TODAY    Does the patient have less than a 3 day supply:  [x] Yes  [] No    Would you like a call back once the refill request has been completed: [] Yes [x] No    If the office needs to give you a call back, can they leave a voicemail: [] Yes [x] No    Roger Piedra Rep   08/09/24 13:32 EDT

## 2024-10-23 ENCOUNTER — OFFICE VISIT (OUTPATIENT)
Dept: FAMILY MEDICINE CLINIC | Facility: CLINIC | Age: 64
End: 2024-10-23
Payer: MEDICARE

## 2024-10-23 VITALS
TEMPERATURE: 98.2 F | BODY MASS INDEX: 31.89 KG/M2 | SYSTOLIC BLOOD PRESSURE: 124 MMHG | WEIGHT: 174.4 LBS | RESPIRATION RATE: 21 BRPM | DIASTOLIC BLOOD PRESSURE: 82 MMHG | HEART RATE: 78 BPM | OXYGEN SATURATION: 99 %

## 2024-10-23 DIAGNOSIS — N39.3 SUI (STRESS URINARY INCONTINENCE, FEMALE): ICD-10-CM

## 2024-10-23 DIAGNOSIS — F33.8 OTHER RECURRENT DEPRESSIVE DISORDERS: ICD-10-CM

## 2024-10-23 DIAGNOSIS — N81.9 FEMALE GENITAL PROLAPSE, UNSPECIFIED TYPE: ICD-10-CM

## 2024-10-23 DIAGNOSIS — I10 ESSENTIAL HYPERTENSION: ICD-10-CM

## 2024-10-23 DIAGNOSIS — C54.1 ENDOMETRIAL ADENOCARCINOMA: Primary | ICD-10-CM

## 2024-10-23 DIAGNOSIS — R73.09 ELEVATED HEMOGLOBIN A1C: ICD-10-CM

## 2024-10-23 LAB
EXPIRATION DATE: NORMAL
HBA1C MFR BLD: 5.5 % (ref 4.5–5.7)
Lab: NORMAL

## 2024-10-23 PROCEDURE — 1159F MED LIST DOCD IN RCRD: CPT | Performed by: FAMILY MEDICINE

## 2024-10-23 PROCEDURE — 83036 HEMOGLOBIN GLYCOSYLATED A1C: CPT | Performed by: FAMILY MEDICINE

## 2024-10-23 PROCEDURE — 3044F HG A1C LEVEL LT 7.0%: CPT | Performed by: FAMILY MEDICINE

## 2024-10-23 PROCEDURE — 99214 OFFICE O/P EST MOD 30 MIN: CPT | Performed by: FAMILY MEDICINE

## 2024-10-23 PROCEDURE — 3074F SYST BP LT 130 MM HG: CPT | Performed by: FAMILY MEDICINE

## 2024-10-23 PROCEDURE — G2211 COMPLEX E/M VISIT ADD ON: HCPCS | Performed by: FAMILY MEDICINE

## 2024-10-23 PROCEDURE — 1160F RVW MEDS BY RX/DR IN RCRD: CPT | Performed by: FAMILY MEDICINE

## 2024-10-23 PROCEDURE — 3079F DIAST BP 80-89 MM HG: CPT | Performed by: FAMILY MEDICINE

## 2024-10-23 PROCEDURE — 1125F AMNT PAIN NOTED PAIN PRSNT: CPT | Performed by: FAMILY MEDICINE

## 2024-10-23 RX ORDER — ESTRADIOL 0.1 MG/D
FILM, EXTENDED RELEASE TRANSDERMAL
COMMUNITY
Start: 2024-08-09

## 2024-10-23 RX ORDER — ESCITALOPRAM OXALATE 10 MG/1
10 TABLET ORAL DAILY
Qty: 90 TABLET | Refills: 3 | Status: SHIPPED | OUTPATIENT
Start: 2024-10-23

## 2024-10-23 RX ORDER — LOSARTAN POTASSIUM 50 MG/1
50 TABLET ORAL DAILY
Qty: 90 TABLET | Refills: 3 | Status: SHIPPED | OUTPATIENT
Start: 2024-10-23

## 2024-10-23 RX ORDER — LOSARTAN POTASSIUM 50 MG/1
50 TABLET ORAL DAILY
Qty: 90 TABLET | Refills: 0 | Status: SHIPPED | OUTPATIENT
Start: 2024-10-23 | End: 2024-10-23 | Stop reason: SDUPTHER

## 2024-10-23 RX ORDER — PROGESTERONE 100 MG/1
1 CAPSULE ORAL
COMMUNITY
Start: 2024-07-18

## 2024-10-23 NOTE — PROGRESS NOTES
Subjective   Gini Solorzano is a 64 y.o. female.     History of Present Illness she is here for hypertension follow-up.  She is currently taking 50 mg of losartan.    She is not taking the metformin. She had A1c 1 year ago 6.4.      It looks like she was recently seen on 9/5/2024 for pessary maintenance which she has for pelvic prolapse.  She had a hysterectomy and stress urinary incontinence.  She has a history of endometrial cancer.    She had endometrial cancer LORY 4 years ago.  Pelvic prolapse.  April symptoms reoccurred.  She saw gyn at .  Saw Elisabet as well.  She is requesting URO gyn Camille Kenney.  She has been doing pt.  Increase stress and emotions over pelvic prolapse.  She was started on estrogen patch.  She has been seeing dr Miranda.      She was prev prescribed celexa in past caused headache.     Breast tenderness with HRT.  Soreness with mammogram.  Last mammogram 8/22.       The following portions of the patient's history were reviewed and updated as appropriate: allergies, current medications, past family history, past medical history, past social history, past surgical history, and problem list.    Review of Systems   Constitutional: Negative.    HENT: Negative.     Eyes: Negative.    Respiratory: Negative.     Cardiovascular: Negative.    Gastrointestinal: Negative.    Endocrine: Negative.    Genitourinary:  Positive for frequency.   Musculoskeletal: Negative.    Skin: Negative.    Allergic/Immunologic: Negative.    Neurological: Negative.    Hematological: Negative.    Psychiatric/Behavioral: Negative.     All other systems reviewed and are negative.      Objective     Vitals:    10/23/24 1006   BP: 124/82   BP Location: Left arm   Patient Position: Sitting   Cuff Size: Adult   Pulse: 78   Resp: 21   Temp: 98.2 °F (36.8 °C)   TempSrc: Infrared   SpO2: 99%   Weight: 79.1 kg (174 lb 6.4 oz)       Physical Exam  Vitals and nursing note reviewed.   Constitutional:       General: She is not in acute  distress.     Appearance: She is well-developed. She is not diaphoretic.   HENT:      Head: Normocephalic and atraumatic.      Right Ear: External ear normal.      Left Ear: External ear normal.   Eyes:      General: Lids are normal. No scleral icterus.        Right eye: No discharge.         Left eye: No discharge.      Conjunctiva/sclera: Conjunctivae normal.      Pupils: Pupils are equal, round, and reactive to light.   Neck:      Thyroid: No thyroid mass or thyromegaly.      Vascular: No carotid bruit or JVD.      Trachea: No tracheal deviation.   Cardiovascular:      Rate and Rhythm: Normal rate and regular rhythm.      Heart sounds: Normal heart sounds. No murmur heard.     No friction rub. No gallop.   Pulmonary:      Effort: Pulmonary effort is normal. No respiratory distress.      Breath sounds: Normal breath sounds. No decreased breath sounds, wheezing, rhonchi or rales.   Chest:      Chest wall: No tenderness.   Abdominal:      General: Bowel sounds are normal. There is no distension.      Palpations: Abdomen is soft. There is no mass.      Tenderness: There is no abdominal tenderness. There is no guarding or rebound.      Hernia: No hernia is present.   Musculoskeletal:         General: Normal range of motion.   Lymphadenopathy:      Cervical: No cervical adenopathy.      Upper Body:      Right upper body: No supraclavicular adenopathy.      Left upper body: No supraclavicular adenopathy.   Skin:     Findings: No bruising, erythema or rash.      Nails: There is no clubbing.   Neurological:      Mental Status: She is alert and oriented to person, place, and time.      Cranial Nerves: No cranial nerve deficit.      Deep Tendon Reflexes: Reflexes are normal and symmetric. Reflexes normal.   Psychiatric:         Speech: Speech normal.         Behavior: Behavior normal.         Thought Content: Thought content normal.         Judgment: Judgment normal.         Assessment & Plan     Problem List Items  Addressed This Visit          Cardiac and Vasculature    Essential hypertension    Relevant Medications    losartan (COZAAR) 50 MG tablet       Genitourinary and Reproductive     CATHIE (stress urinary incontinence, female)    Pelvic prolapse    Relevant Orders    Ambulatory Referral to Gynecologic Urology       Hematology and Neoplasia    Endometrial adenocarcinoma - Primary    Overview     Formatting of this note might be different from the original.  60 yo female with Stage IA G1 EAC  S/P RATLH, BSO + USLS/[facility] (UROGYN) on 4/13/2020.    Path with no myometrial invasion or LVSI  No adjuvant treatments indicated at this time    Fully healed. Counseled on PFPT, Kegel exercises.    - reassurance  - continue vagifem  - f/u 6 month for surveillance          Other Visit Diagnoses       Elevated hemoglobin A1c        Relevant Orders    POC Glycosylated Hemoglobin (Hb A1C)    Other recurrent depressive disorders        Relevant Medications    escitalopram (Lexapro) 10 MG tablet          She is wanting to try CBD oil for anxiety.  She likes to read on [employer].      Start lexapro 10 mg daily.  Fu 3 months.      Uro gyn at     Proceed with mammograms.     Poct A1c today. 5.5    Fu 3 months for recheck A1c since she has stopped metformin.  She is established with Dr Miranda.

## 2024-11-04 ENCOUNTER — OFFICE VISIT (OUTPATIENT)
Dept: FAMILY MEDICINE CLINIC | Facility: CLINIC | Age: 64
End: 2024-11-04
Payer: MEDICARE

## 2024-11-04 ENCOUNTER — HOSPITAL ENCOUNTER (OUTPATIENT)
Dept: GENERAL RADIOLOGY | Facility: HOSPITAL | Age: 64
Discharge: HOME | End: 2024-11-04
Admitting: FAMILY MEDICINE
Payer: MEDICARE

## 2024-11-04 VITALS
BODY MASS INDEX: 31.6 KG/M2 | SYSTOLIC BLOOD PRESSURE: 128 MMHG | DIASTOLIC BLOOD PRESSURE: 76 MMHG | TEMPERATURE: 98.2 F | OXYGEN SATURATION: 99 % | RESPIRATION RATE: 20 BRPM | WEIGHT: 172.8 LBS | HEART RATE: 84 BPM

## 2024-11-04 DIAGNOSIS — M25.562 CHRONIC PAIN OF LEFT KNEE: Primary | ICD-10-CM

## 2024-11-04 DIAGNOSIS — G89.29 CHRONIC PAIN OF LEFT KNEE: Primary | ICD-10-CM

## 2024-11-04 DIAGNOSIS — N39.3 SUI (STRESS URINARY INCONTINENCE, FEMALE): ICD-10-CM

## 2024-11-04 PROCEDURE — 3078F DIAST BP <80 MM HG: CPT | Performed by: FAMILY MEDICINE

## 2024-11-04 PROCEDURE — 1159F MED LIST DOCD IN RCRD: CPT | Performed by: FAMILY MEDICINE

## 2024-11-04 PROCEDURE — 99214 OFFICE O/P EST MOD 30 MIN: CPT | Performed by: FAMILY MEDICINE

## 2024-11-04 PROCEDURE — 3074F SYST BP LT 130 MM HG: CPT | Performed by: FAMILY MEDICINE

## 2024-11-04 PROCEDURE — 73560 X-RAY EXAM OF KNEE 1 OR 2: CPT

## 2024-11-04 PROCEDURE — 1160F RVW MEDS BY RX/DR IN RCRD: CPT | Performed by: FAMILY MEDICINE

## 2024-11-04 PROCEDURE — 1125F AMNT PAIN NOTED PAIN PRSNT: CPT | Performed by: FAMILY MEDICINE

## 2024-11-04 NOTE — PROGRESS NOTES
Subjective   Gini Solorzano is a 64 y.o. female.     History of Present Illness bad left knee ongoing for 2 years.  Worse for 6 months.  Worse when sitting down when she does to stand up pain in back of knee.  Pain on sides, As well.      She is already seeing physical therapy for pelvic floor PT.  She is requesting a referral to a new gynecologist preferably a female that we will be willing to help her with benefits and risk of estrogen therapy    The following portions of the patient's history were reviewed and updated as appropriate: allergies, current medications, past family history, past medical history, past social history, past surgical history, and problem list.    Review of Systems   Constitutional:  Negative for chills, fatigue, fever and unexpected weight change.   HENT:  Negative for congestion, ear pain, nosebleeds, rhinorrhea, sinus pressure, sneezing, sore throat and trouble swallowing.    Eyes:  Negative for itching and visual disturbance.   Respiratory:  Negative for cough, chest tightness, shortness of breath and wheezing.    Cardiovascular:  Negative for chest pain, palpitations and leg swelling.   Gastrointestinal:  Negative for abdominal pain, anal bleeding, blood in stool, constipation, diarrhea, nausea and vomiting.   Endocrine: Negative for cold intolerance, heat intolerance, polydipsia and polyuria.   Genitourinary:  Negative for difficulty urinating, frequency, hematuria and urgency.   Musculoskeletal:  Positive for joint swelling. Negative for back pain, gait problem and myalgias.   Skin:  Negative for rash and wound.   Neurological:  Negative for dizziness, weakness, numbness and headaches.   Hematological:  Negative for adenopathy. Does not bruise/bleed easily.   Psychiatric/Behavioral:  Negative for agitation, confusion, decreased concentration and suicidal ideas. The patient is not nervous/anxious.        Objective     Vitals:    11/04/24 1049   BP: 128/76   BP Location: Left arm    Patient Position: Sitting   Cuff Size: Adult   Pulse: 84   Resp: 20   Temp: 98.2 °F (36.8 °C)   TempSrc: Infrared   SpO2: 99%   Weight: 78.4 kg (172 lb 12.8 oz)       Physical Exam  Constitutional:       General: She is not in acute distress.     Appearance: She is well-developed. She is not diaphoretic.   HENT:      Head: Normocephalic and atraumatic.      Right Ear: External ear normal.      Left Ear: External ear normal.   Eyes:      General: Lids are normal. No scleral icterus.        Right eye: No discharge.         Left eye: No discharge.      Conjunctiva/sclera: Conjunctivae normal.      Pupils: Pupils are equal, round, and reactive to light.   Neck:      Thyroid: No thyroid mass or thyromegaly.      Vascular: No carotid bruit or JVD.      Trachea: No tracheal deviation.   Cardiovascular:      Rate and Rhythm: Normal rate and regular rhythm.      Heart sounds: Normal heart sounds. No murmur heard.     No friction rub. No gallop.   Pulmonary:      Effort: Pulmonary effort is normal. No respiratory distress.      Breath sounds: Normal breath sounds. No decreased breath sounds, wheezing, rhonchi or rales.   Chest:      Chest wall: No tenderness.   Abdominal:      General: Bowel sounds are normal. There is no distension.      Palpations: Abdomen is soft. There is no mass.      Tenderness: There is no abdominal tenderness. There is no guarding or rebound.      Hernia: No hernia is present.   Musculoskeletal:         General: Normal range of motion.   Lymphadenopathy:      Cervical: No cervical adenopathy.      Upper Body:      Right upper body: No supraclavicular adenopathy.      Left upper body: No supraclavicular adenopathy.   Skin:     Findings: No bruising, erythema or rash.      Nails: There is no clubbing.   Neurological:      Mental Status: She is alert and oriented to person, place, and time.      Cranial Nerves: No cranial nerve deficit.      Deep Tendon Reflexes: Reflexes are normal and symmetric.  Reflexes normal.   Psychiatric:         Speech: Speech normal.         Behavior: Behavior normal.         Thought Content: Thought content normal.         Judgment: Judgment normal.         Assessment & Plan     Problem List Items Addressed This Visit          Genitourinary and Reproductive     CATHIE (stress urinary incontinence, female)    Relevant Orders    Ambulatory Referral to Gynecology     Other Visit Diagnoses       Chronic pain of left knee    -  Primary    Relevant Orders    XR Knee 1 or 2 View Left (In Office)    Ambulatory Referral to Physical Therapy for Evaluation & Treatment

## 2024-11-06 ENCOUNTER — TELEPHONE (OUTPATIENT)
Dept: FAMILY MEDICINE CLINIC | Facility: CLINIC | Age: 64
End: 2024-11-06
Payer: MEDICARE

## 2024-11-06 DIAGNOSIS — M25.562 CHRONIC PAIN OF LEFT KNEE: Primary | ICD-10-CM

## 2024-11-06 DIAGNOSIS — G89.29 CHRONIC PAIN OF LEFT KNEE: Primary | ICD-10-CM

## 2024-11-06 NOTE — PROGRESS NOTES
Notify the patient that the result of her x-ray looks like arthritis.  We will go ahead and place a referral for her to see orthopedist to see if she just needs to continue taking an anti-inflammatory or do physical therapy or if they think an injection would be beneficial.

## 2024-11-06 NOTE — TELEPHONE ENCOUNTER
Caller: Gini Solorzano    Relationship: Self    Best call back number: [phone]     What is the medical concern/diagnosis: PELVIC FLOOR CONDITION     What specialty or service is being requested: PHYSICAL THERAPY     What is the provider, practice or medical service name: [facility]     What is the office location:   [address] [place] [address]    What is the office phone number: [phone]    Any additional details: PATIENT STATES SHE ATTEMPTED TO SCHEDULE AN APPOINTMENT WITH PHYSICAL THERAPY FOR BOTH THE PELVIC FLOOR CONDITION AND HER KNEE BUT WAS TOLD SHE WOULD NEED A NEW REFERRAL BECAUSE SHE HAS NOT BEEN SEEN IN THE LAST 30 DAYS FOR THIS CONDITION.

## 2024-11-07 ENCOUNTER — HOSPITAL ENCOUNTER (OUTPATIENT)
Facility: HOSPITAL | Age: 64
Discharge: HOME | End: 2024-11-07
Admitting: STUDENT IN AN ORGANIZED HEALTH CARE EDUCATION/TRAINING PROGRAM
Payer: MEDICARE

## 2024-11-07 DIAGNOSIS — Z12.31 ENCOUNTER FOR SCREENING MAMMOGRAM FOR MALIGNANT NEOPLASM OF BREAST: ICD-10-CM

## 2024-11-07 DIAGNOSIS — R32 URINARY INCONTINENCE, UNSPECIFIED TYPE: Primary | ICD-10-CM

## 2024-11-07 PROCEDURE — 77063 BREAST TOMOSYNTHESIS BI: CPT

## 2024-11-07 PROCEDURE — 77067 SCR MAMMO BI INCL CAD: CPT

## 2024-11-07 NOTE — TELEPHONE ENCOUNTER
Ordered . Please have her schedule for a urine test for any blood in urine urgency frequency changes or burning to make sure no infection.

## 2024-11-11 ENCOUNTER — TELEPHONE (OUTPATIENT)
Dept: FAMILY MEDICINE CLINIC | Facility: CLINIC | Age: 64
End: 2024-11-11
Payer: MEDICARE

## 2024-11-11 NOTE — TELEPHONE ENCOUNTER
HUB TO RELAY    M FOR PT TO CALL [phone]    Notify the patient that the result of her x-ray looks like arthritis.  We will go ahead and place a referral for her to see orthopedist to see if she just needs to continue taking an anti-inflammatory or do physical therapy or if they think an injection would be beneficial.

## 2024-12-06 ENCOUNTER — HOSPITAL ENCOUNTER (OUTPATIENT)
Facility: HOSPITAL | Age: 64
Discharge: HOME | End: 2024-12-06
Payer: MEDICARE

## 2024-12-06 DIAGNOSIS — R92.8 ABNORMAL MAMMOGRAM: ICD-10-CM

## 2024-12-06 PROCEDURE — 76642 ULTRASOUND BREAST LIMITED: CPT

## 2025-01-22 ENCOUNTER — TELEPHONE (OUTPATIENT)
Dept: FAMILY MEDICINE CLINIC | Facility: CLINIC | Age: 65
End: 2025-01-22
Payer: MEDICARE

## 2025-01-22 NOTE — TELEPHONE ENCOUNTER
Caller: Gini Solorzano    Relationship: Self    Best call back number: [phone]     What is the best time to reach you: ANY    Who are you requesting to speak with (clinical staff, provider,  specific staff member): CLINICAL    What was the call regarding: PATIENT WANTS TO KNOW IF SHE WILL NEED TO HAVE BLOOD WORK DONE FOR THE APPOINTMENT 01.31.25, AND IF SO IF THE LABWORK CAN BE DONE BEFORE THE APPOINTMENT. PATIENT WOULD ALSO LIKE TO KNOW IF SHE NEED FOR THE LABS IF THERE ARE ANY    Is it okay if the provider responds through MyChart: YES

## 2025-01-23 NOTE — TELEPHONE ENCOUNTER
I can discuss with her at the visit so she doesn't have to fast we can always schedule labs after visit fasted without apt

## 2025-01-31 ENCOUNTER — LAB (OUTPATIENT)
Dept: LAB | Facility: HOSPITAL | Age: 65
End: 2025-01-31
Payer: MEDICARE

## 2025-01-31 ENCOUNTER — OFFICE VISIT (OUTPATIENT)
Dept: FAMILY MEDICINE CLINIC | Facility: CLINIC | Age: 65
End: 2025-01-31
Payer: MEDICARE

## 2025-01-31 VITALS
HEART RATE: 72 BPM | DIASTOLIC BLOOD PRESSURE: 96 MMHG | WEIGHT: 169.6 LBS | TEMPERATURE: 98 F | RESPIRATION RATE: 20 BRPM | BODY MASS INDEX: 31.21 KG/M2 | HEIGHT: 62 IN | SYSTOLIC BLOOD PRESSURE: 126 MMHG | OXYGEN SATURATION: 100 %

## 2025-01-31 DIAGNOSIS — I10 ESSENTIAL HYPERTENSION: Primary | ICD-10-CM

## 2025-01-31 DIAGNOSIS — R79.9 ABNORMAL FINDING OF BLOOD CHEMISTRY, UNSPECIFIED: ICD-10-CM

## 2025-01-31 LAB
ALBUMIN SERPL-MCNC: 4.1 G/DL (ref 3.5–5.2)
ALBUMIN/GLOB SERPL: 1.1 G/DL
ALP SERPL-CCNC: 53 U/L (ref 39–117)
ALT SERPL W P-5'-P-CCNC: 15 U/L (ref 1–33)
ANION GAP SERPL CALCULATED.3IONS-SCNC: 13.5 MMOL/L (ref 5–15)
AST SERPL-CCNC: 26 U/L (ref 1–32)
BILIRUB SERPL-MCNC: 0.5 MG/DL (ref 0–1.2)
BUN SERPL-MCNC: 12 MG/DL (ref 8–23)
BUN/CREAT SERPL: 12.9 (ref 7–25)
CALCIUM SPEC-SCNC: 9.3 MG/DL (ref 8.6–10.5)
CHLORIDE SERPL-SCNC: 99 MMOL/L (ref 98–107)
CHOLEST SERPL-MCNC: 227 MG/DL (ref 0–200)
CO2 SERPL-SCNC: 24.5 MMOL/L (ref 22–29)
CREAT SERPL-MCNC: 0.93 MG/DL (ref 0.57–1)
DEPRECATED RDW RBC AUTO: 40.2 FL (ref 37–54)
EGFRCR SERPLBLD CKD-EPI 2021: 68.3 ML/MIN/1.73
ERYTHROCYTE [DISTWIDTH] IN BLOOD BY AUTOMATED COUNT: 12.2 % (ref 12.3–15.4)
GLOBULIN UR ELPH-MCNC: 3.8 GM/DL
GLUCOSE SERPL-MCNC: 97 MG/DL (ref 65–99)
HBA1C MFR BLD: 5.8 % (ref 4.8–5.6)
HCT VFR BLD AUTO: 45.3 % (ref 34–46.6)
HDLC SERPL-MCNC: 50 MG/DL (ref 40–60)
HGB BLD-MCNC: 15.4 G/DL (ref 12–15.9)
LDLC SERPL CALC-MCNC: 155 MG/DL (ref 0–100)
LDLC/HDLC SERPL: 3.04 {RATIO}
MCH RBC QN AUTO: 30.5 PG (ref 26.6–33)
MCHC RBC AUTO-ENTMCNC: 34 G/DL (ref 31.5–35.7)
MCV RBC AUTO: 89.7 FL (ref 79–97)
PLATELET # BLD AUTO: 436 10*3/MM3 (ref 140–450)
PMV BLD AUTO: 9.3 FL (ref 6–12)
POTASSIUM SERPL-SCNC: 4.3 MMOL/L (ref 3.5–5.2)
PROT SERPL-MCNC: 7.9 G/DL (ref 6–8.5)
RBC # BLD AUTO: 5.05 10*6/MM3 (ref 3.77–5.28)
SODIUM SERPL-SCNC: 137 MMOL/L (ref 136–145)
TRIGL SERPL-MCNC: 125 MG/DL (ref 0–150)
TSH SERPL DL<=0.05 MIU/L-ACNC: 2.95 UIU/ML (ref 0.27–4.2)
VIT B12 BLD-MCNC: 492 PG/ML (ref 211–946)
VLDLC SERPL-MCNC: 22 MG/DL (ref 5–40)
WBC NRBC COR # BLD AUTO: 7.85 10*3/MM3 (ref 3.4–10.8)

## 2025-01-31 PROCEDURE — 80053 COMPREHEN METABOLIC PANEL: CPT | Performed by: STUDENT IN AN ORGANIZED HEALTH CARE EDUCATION/TRAINING PROGRAM

## 2025-01-31 PROCEDURE — 99214 OFFICE O/P EST MOD 30 MIN: CPT | Performed by: STUDENT IN AN ORGANIZED HEALTH CARE EDUCATION/TRAINING PROGRAM

## 2025-01-31 PROCEDURE — 3074F SYST BP LT 130 MM HG: CPT | Performed by: STUDENT IN AN ORGANIZED HEALTH CARE EDUCATION/TRAINING PROGRAM

## 2025-01-31 PROCEDURE — G2211 COMPLEX E/M VISIT ADD ON: HCPCS | Performed by: STUDENT IN AN ORGANIZED HEALTH CARE EDUCATION/TRAINING PROGRAM

## 2025-01-31 PROCEDURE — 82607 VITAMIN B-12: CPT | Performed by: STUDENT IN AN ORGANIZED HEALTH CARE EDUCATION/TRAINING PROGRAM

## 2025-01-31 PROCEDURE — 80061 LIPID PANEL: CPT | Performed by: STUDENT IN AN ORGANIZED HEALTH CARE EDUCATION/TRAINING PROGRAM

## 2025-01-31 PROCEDURE — 83036 HEMOGLOBIN GLYCOSYLATED A1C: CPT | Performed by: STUDENT IN AN ORGANIZED HEALTH CARE EDUCATION/TRAINING PROGRAM

## 2025-01-31 PROCEDURE — 3080F DIAST BP >= 90 MM HG: CPT | Performed by: STUDENT IN AN ORGANIZED HEALTH CARE EDUCATION/TRAINING PROGRAM

## 2025-01-31 PROCEDURE — 84443 ASSAY THYROID STIM HORMONE: CPT | Performed by: STUDENT IN AN ORGANIZED HEALTH CARE EDUCATION/TRAINING PROGRAM

## 2025-01-31 PROCEDURE — 1126F AMNT PAIN NOTED NONE PRSNT: CPT | Performed by: STUDENT IN AN ORGANIZED HEALTH CARE EDUCATION/TRAINING PROGRAM

## 2025-01-31 PROCEDURE — 85027 COMPLETE CBC AUTOMATED: CPT | Performed by: STUDENT IN AN ORGANIZED HEALTH CARE EDUCATION/TRAINING PROGRAM

## 2025-01-31 PROCEDURE — 1160F RVW MEDS BY RX/DR IN RCRD: CPT | Performed by: STUDENT IN AN ORGANIZED HEALTH CARE EDUCATION/TRAINING PROGRAM

## 2025-01-31 PROCEDURE — 1159F MED LIST DOCD IN RCRD: CPT | Performed by: STUDENT IN AN ORGANIZED HEALTH CARE EDUCATION/TRAINING PROGRAM

## 2025-01-31 NOTE — PROGRESS NOTES
"Chief Complaint  Hypertension (Follow up)    Hypertension          Htn  No issues with the metformin.   Bp at home stable per patient.       She did not take the lexparo, feeling better.       Prolapse  Working with urology and gyn    No other complaints.     The following portions of the patient's history were reviewed and updated as appropriate: allergies, current medications, past family history, past medical history, past social history, past surgical history, and problem list.    OBJECTIVE:  /96   Pulse 72   Temp 98 °F (36.7 °C) (Temporal)   Resp 20   Ht 157.5 cm (62.01\")   Wt 76.9 kg (169 lb 9.6 oz)   SpO2 100%   BMI 31.01 kg/m²       Physical Exam  Constitutional:       General: She is not in acute distress.     Appearance: Normal appearance.   HENT:      Head: Normocephalic and atraumatic.   Eyes:      Extraocular Movements: Extraocular movements intact.   Cardiovascular:      Rate and Rhythm: Normal rate and regular rhythm.      Heart sounds: No murmur heard.  Pulmonary:      Effort: Pulmonary effort is normal. No respiratory distress.      Breath sounds: Normal breath sounds. No stridor. No wheezing, rhonchi or rales.   Skin:     Findings: No rash.   Neurological:      General: No focal deficit present.      Mental Status: She is alert.   Psychiatric:         Mood and Affect: Mood normal.                  Assessment and Plan   Diagnoses and all orders for this visit:    1. Essential hypertension (Primary)  -     CBC (No Diff); Future  -     Comprehensive Metabolic Panel; Future  -     Hemoglobin A1c; Future  -     Lipid Panel; Future  -     TSH Rfx On Abnormal To Free T4; Future  -     Vitamin B12; Future    2. Abnormal finding of blood chemistry, unspecified  -     Hemoglobin A1c; Future        Check labs. She will continue to monitor bp at home for goal 120/80.     Reviewed specialist note x2.    Continue losartan.      Return in about 6 months (around 7/31/2025) for Annual physical. "       Sandra Miranda D.O.  [facility] [place]

## 2025-02-03 NOTE — PROGRESS NOTES
Please let the patient know that cholesterol is elevated with prediabetes which is a risk factor for heart attack stroke or vascular complications over time. Recommend diet with lean meat fish and vegetables and decreased sugar/carbs with daily exercise. Can refer to phone dietitian if this helps let me know. We can also start a medication called lipitor which can lower this risk. It can cause muscle pain or elevated liver tests for which we would monitor. Let me know if interested.

## 2025-02-04 NOTE — PROGRESS NOTES
Subjective   Gini Solorzano is a 64 y.o. female.     History of Present Illness     Review of Systems    Objective     Vitals:    06/24/24 1533   BP: 130/82   BP Location: Left arm   Patient Position: Sitting   Cuff Size: Adult   Pulse: 81   Resp: 21   Temp: 98 °F (36.7 °C)   TempSrc: Infrared   SpO2: 100%   Weight: 77.4 kg (170 lb 9.6 oz)       Physical Exam    Assessment & Plan     Problem List Items Addressed This Visit    None  Visit Diagnoses       ERRONEOUS ENCOUNTER--DISREGARD    -  Primary              The patient left the office before care was provided and did not complete the visit because of the wait time.

## 2025-02-07 ENCOUNTER — TELEPHONE (OUTPATIENT)
Dept: FAMILY MEDICINE CLINIC | Facility: CLINIC | Age: 65
End: 2025-02-07
Payer: MEDICARE

## 2025-02-07 NOTE — TELEPHONE ENCOUNTER
"Hub relay: \"Please let the patient know that cholesterol is elevated with prediabetes which is a risk factor for heart attack stroke or vascular complications over time. Recommend diet with lean meat fish and vegetables and decreased sugar/carbs with daily exercise. Can refer to phone dietitian if this helps let me know. We can also start a medication called lipitor which can lower this risk. It can cause muscle pain or elevated liver tests for which we would monitor. Let me know if interested. \" Lm for pt to call us back  "

## 2025-02-07 NOTE — TELEPHONE ENCOUNTER
----- Message from Sandra Miranda sent at 2/3/2025  9:03 AM EST -----  Please let the patient know that cholesterol is elevated with prediabetes which is a risk factor for heart attack stroke or vascular complications over time. Recommend diet with lean meat fish and vegetables and decreased sugar/carbs with daily exercise. Can refer to phone dietitian if this helps let me know. We can also start a medication called lipitor which can lower this risk. It can cause muscle pain or elevated liver tests for which we would monitor. Let me know if interested.